# Patient Record
Sex: FEMALE | Race: WHITE
[De-identification: names, ages, dates, MRNs, and addresses within clinical notes are randomized per-mention and may not be internally consistent; named-entity substitution may affect disease eponyms.]

---

## 2017-01-23 ENCOUNTER — HOSPITAL ENCOUNTER (INPATIENT)
Dept: HOSPITAL 45 - C.EDC | Age: 62
LOS: 1 days | Discharge: ANOTHER HOSPITAL | DRG: 534 | End: 2017-01-24
Attending: HOSPITALIST | Admitting: HOSPITALIST
Payer: COMMERCIAL

## 2017-01-23 VITALS
WEIGHT: 244.71 LBS | HEIGHT: 60 IN | BODY MASS INDEX: 48.04 KG/M2 | WEIGHT: 244.71 LBS | HEIGHT: 60 IN | BODY MASS INDEX: 48.04 KG/M2

## 2017-01-23 DIAGNOSIS — M17.11: ICD-10-CM
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
## 2017-01-23 DIAGNOSIS — E66.9: ICD-10-CM

## 2017-01-23 DIAGNOSIS — Q89.01: ICD-10-CM

## 2017-01-23 DIAGNOSIS — F41.9: ICD-10-CM

## 2017-01-23 DIAGNOSIS — I10: ICD-10-CM

## 2017-01-23 DIAGNOSIS — Z90.81: ICD-10-CM

## 2017-01-23 DIAGNOSIS — Z79.82: ICD-10-CM

## 2017-01-23 DIAGNOSIS — S72.351A: Primary | ICD-10-CM

## 2017-01-23 DIAGNOSIS — D58.0: ICD-10-CM

## 2017-01-23 DIAGNOSIS — F32.9: ICD-10-CM

## 2017-01-23 DIAGNOSIS — Z79.899: ICD-10-CM

## 2017-01-23 DIAGNOSIS — Z79.1: ICD-10-CM

## 2017-01-23 DIAGNOSIS — Z87.891: ICD-10-CM
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
## 2017-01-23 LAB
ANION GAP SERPL CALC-SCNC: 13 MMOL/L (ref 3–11)
BASOPHILS # BLD: 0.11 K/UL (ref 0–0.2)
BASOPHILS NFR BLD: 0.3 %
BUN SERPL-MCNC: 18 MG/DL (ref 7–18)
BUN/CREAT SERPL: 16 (ref 10–20)
CALCIUM SERPL-MCNC: 9.1 MG/DL (ref 8.5–10.1)
CHLORIDE SERPL-SCNC: 104 MMOL/L (ref 98–107)
CO2 SERPL-SCNC: 24 MMOL/L (ref 21–32)
COMPLETE: YES
CREAT CL PREDICTED SERPL C-G-VRATE: 62.3 ML/MIN
CREAT SERPL-MCNC: 1.1 MG/DL (ref 0.6–1.2)
EOSINOPHIL NFR BLD AUTO: 369 K/UL (ref 130–400)
GLUCOSE SERPL-MCNC: 112 MG/DL (ref 70–99)
HCT VFR BLD CALC: 45 % (ref 37–47)
IG%: 0.9 %
IMM GRANULOCYTES NFR BLD AUTO: 7.8 %
INR PPP: 1 (ref 0.9–1.1)
LYMPHOCYTES # BLD: 2.95 K/UL (ref 1.2–3.4)
MCH RBC QN AUTO: 28.9 PG (ref 25–34)
MCHC RBC AUTO-ENTMCNC: 34.7 G/DL (ref 32–36)
MCV RBC AUTO: 83.3 FL (ref 80–100)
MONOCYTES NFR BLD: 5.9 %
NEUTROPHILS # BLD AUTO: 0.8 %
NEUTROPHILS NFR BLD AUTO: 84.3 %
NRBC BLD AUTO-RTO: 0.2 %
PARTIAL THROMBOPLASTIN RATIO: 1
PMV BLD AUTO: 12.3 FL (ref 7.4–10.4)
POTASSIUM SERPL-SCNC: 3.3 MMOL/L (ref 3.5–5.1)
PROTHROMBIN TIME: 10.7 SECONDS (ref 9–12)
RBC # BLD AUTO: 5.4 M/UL (ref 4.2–5.4)
SODIUM SERPL-SCNC: 141 MMOL/L (ref 136–145)
WBC # BLD AUTO: 37.83 K/UL (ref 4.8–10.8)

## 2017-01-23 RX ADMIN — HYDROMORPHONE HYDROCHLORIDE PRN MG: 1 INJECTION, SOLUTION INTRAMUSCULAR; INTRAVENOUS; SUBCUTANEOUS at 22:50

## 2017-01-23 RX ADMIN — HYDROMORPHONE HYDROCHLORIDE PRN MG: 1 INJECTION, SOLUTION INTRAMUSCULAR; INTRAVENOUS; SUBCUTANEOUS at 21:08

## 2017-01-23 NOTE — DIAGNOSTIC IMAGING REPORT
SINGLE VIEW PELVIS



CLINICAL HISTORY: Fall with right femoral fracture.



FINDINGS: 2 AP pelvic radiograph are correlated with pelvic CT dated 10/14/2013.

The skeletal structures are osteopenic. There is no radiographic evidence of

fracture involving the hips or bony pelvis. Mild arthritic change is seen in the

hip joints. The sacroiliac joints are normal in appearance. The overlying soft

tissues are unremarkable. There is a nonobstructed abdominal bowel gas pattern.



IMPRESSION: No fracture is identified in the hips or bony pelvis.







Electronically signed by:  Francois Pike M.D.

1/23/2017 10:00 PM



Dictated Date/Time:  1/23/2017 9:58 PM

## 2017-01-23 NOTE — DIAGNOSTIC IMAGING REPORT
RIGHT FEMUR 3 VIEWS; RIGHT KNEE 2 VIEWS



CLINICAL HISTORY: Fall with right leg pain.



FINDINGS: AP, frog-leg, and lateral views of the right femur with AP and lateral

views of the right knee are obtained. No prior studies are available for

comparison at the time of dictation. The skeletal structures are osteopenic.

There is a comminuted fracture involving the distal femoral diaphysis. There are

large distracted fragments. The distal femoral shaft is distracted medially by

approximately 4 cm, and there is apex dorsal angulation. There is overriding of

the fragments by approximately 3 cm. There is significant surrounding soft

tissue edema. Fracture does not appear to extend to the knee joint. The proximal

femur and the imaged right bony pelvis appear intact. The proximal tibia and

fibula are grossly intact. Arthritic change is present in the knee.



IMPRESSION:



1. There is a comminuted, overriding, and angulated fracture of the distal

femoral diaphysis with numerous distracted fragments and overlying soft tissue

edema.



2. The proximal femur appears intact, as do the proximal tibia and fibula.



3. Fracture does not appear to extend to the knee joint.







Electronically signed by:  Francois Pike M.D.

1/23/2017 9:56 PM



Dictated Date/Time:  1/23/2017 9:53 PM

## 2017-01-23 NOTE — HISTORY AND PHYSICAL
History & Physical


Date & Time of Service:


Jan 23, 2017 at 22:49


Chief Complaint:


Knee & Side Pain, Fall


Primary Care Physician:


DINESH West M.D.


History of Present Illness


Source:  patient


Dorothy Marshall is a 60 yo asplenic female with history of hereditary spherocytosis, 

s/p splenectomy, and hypertension, who presents with severe right leg pain 

after a fall on ice earlier today. She was stepping down a step and slipped and 

fell. She was brought to the ED and found to have a significant comminuted 

distal femur fracture. She reports her pain had improved with dilaudid but is 

slowly returning now. She feels better when her leg is slightly straightened 

out.





Past Medical/Surgical History


PMHx:


- Anxiety


- Depression


- Asplenia


- Hereditary spherocytosis





PSHx:


- S/P Splenectomy - spleen ruptured on own apparently, was sent to [place] 

from [facility]. Sister reports she is completely vaccinated.


- S/P Cholecystectomy


- S/P Appendectomy





Family History





Diabetes mellitus


FH: CVA (cerebrovascular accident)


FHx: cancer


Hypertension


FHx of hereditary spherocytosis as well





Social History


Smoking Status:  Former Smoker


Drug Use:  none





Immunizations


History of Influenza Vaccine:  No


History of Tetanus Vaccine?:  No


History of Pneumococcal:  No


History of Hepatitis B Vaccine:  No





Allergies


Coded Allergies:  


     Prednisone (Verified  Allergy, Severe, HIVES, 1/23/17)


     Metronidazole (Verified  Adverse Reaction, Unknown, "jumpiness" per pt, 1/ 23/17)





Home Medications


Scheduled


Aspirin (Aspirin Ec), 81 MG PO QAM


Ibuprofen (Advil), 400 MG PO BID


Lisinopril (Prinivil), 40 MG PO QAM


Nebivolol Hcl (Bystolic), 10 MG PO QPM


Sertraline (Zoloft), 50 MG PO QAM





Scheduled PRN


Lorazepam (Ativan), 0.5 MG PO BID PRN for Anxiety





Review of Systems


See HPI for pertinent positives & negatives. A total of 10 systems reviewed and 

were otherwise negative.





Physical Exam


Vital Signs











  Date Time  Temp Pulse Resp B/P Pulse Ox O2 Delivery O2 Flow Rate FiO2


 


1/23/17 22:41  92 16 169/105 99   


 


1/23/17 21:34  86 18 181/102 96   


 


1/23/17 21:20       4.0 


 


1/23/17 19:57 36.7 88 20 193/103 99   








General Appearance:  WD/WN, + mild distress


Head:  normocephalic, atraumatic


Eyes:  normal inspection


ENT:  hearing grossly normal


Neck:  supple, no JVD


Respiratory/Chest:  lungs clear, normal breath sounds, no respiratory distress


Cardiovascular:  regular rate, rhythm, no murmur, normal peripheral pulses


Abdomen/GI:  normal bowel sounds, non tender, soft


Extremities/Musculoskelatal:  + pertinent finding (right leg tender, limited 

ROM causing pain in all modalities. good peripheral pulses. )


Neurologic/Psych:  alert, normal mood/affect, oriented x 3


Skin:  no rash





Diagnostics


Laboratory Results





Results Past 24 Hours








Test


  1/23/17


20:16 Range/Units


 


 


White Blood Count 37.83 4.8-10.8  K/uL


 


Red Blood Count 5.40 4.2-5.4  M/uL


 


Hemoglobin 15.6 12.0-16.0  g/dL


 


Hematocrit 45.0 37-47  %


 


Mean Corpuscular Volume 83.3   fL


 


Mean Corpuscular Hemoglobin 28.9 25-34  pg


 


Mean Corpuscular Hemoglobin


Concent 34.7


  32-36  g/dl


 


 


Platelet Count 369 130-400  K/uL


 


Mean Platelet Volume 12.3 7.4-10.4  fL


 


Neutrophils (%) (Auto) 84.3  %


 


Lymphocytes (%) (Auto) 7.8  %


 


Monocytes (%) (Auto) 5.9  %


 


Eosinophils (%) (Auto) 0.8  %


 


Basophils (%) (Auto) 0.3  %


 


Neutrophils # (Auto) 31.89 1.4-6.5  K/uL


 


Lymphocytes # (Auto) 2.95 1.2-3.4  K/uL


 


Monocytes # (Auto) 2.23 0.11-0.59  K/uL


 


Eosinophils # (Auto) 0.30 0-0.5  K/uL


 


Basophils # (Auto) 0.11 0-0.2  K/uL


 


RDW Standard Deviation 44.0 36.4-46.3  fL


 


RDW Coefficient of Variation 14.5 11.5-14.5  %


 


Immature Granulocyte % (Auto) 0.9  %


 


Immature Granulocyte # (Auto) 0.35 0.00-0.02  K/uL


 


Nucleated RBC Absolute Count


(auto) 0.09


  0-0  K/uL


 


 


Nucleated Red Blood Cells % 0.2  %


 


Sodium Level 141 136-145  mmol/L


 


Potassium Level 3.3 3.5-5.1  mmol/L


 


Chloride Level 104   mmol/L


 


Carbon Dioxide Level 24 21-32  mmol/L


 


Anion Gap 13.0 3-11  mmol/L


 


Blood Urea Nitrogen 18 7-18  mg/dl


 


Creatinine


  1.10


  0.60-1.20


mg/dl


 


Est Creatinine Clear Calc


Drug Dose 62.3


   ml/min


 


 


Estimated GFR (


American) 63.6


   


 


 


Estimated GFR (Non-


American 54.9


   


 


 


BUN/Creatinine Ratio 16.0 10-20  


 


Random Glucose 112 70-99  mg/dl


 


Calcium Level 9.1 8.5-10.1  mg/dl











Diagnostic Radiology


RIGHT FEMUR 3 VIEWS; RIGHT KNEE 2 VIEWS





CLINICAL HISTORY: Fall with right leg pain.





FINDINGS: AP, frog-leg, and lateral views of the right femur with AP and lateral


views of the right knee are obtained. No prior studies are available for


comparison at the time of dictation. The skeletal structures are osteopenic.


There is a comminuted fracture involving the distal femoral diaphysis. There are


large distracted fragments. The distal femoral shaft is distracted medially by


approximately 4 cm, and there is apex dorsal angulation. There is overriding of


the fragments by approximately 3 cm. There is significant surrounding soft


tissue edema. Fracture does not appear to extend to the knee joint. The proximal


femur and the imaged right bony pelvis appear intact. The proximal tibia and


fibula are grossly intact. Arthritic change is present in the knee.





IMPRESSION:





1. There is a comminuted, overriding, and angulated fracture of the distal


femoral diaphysis with numerous distracted fragments and overlying soft tissue


edema.





2. The proximal femur appears intact, as do the proximal tibia and fibula.





3. Fracture does not appear to extend to the knee joint.











Electronically signed by:  Francois Pike M.D.


1/23/2017 9:56 PM





Dictated Date/Time:  1/23/2017 9:53 PM\








SINGLE VIEW PELVIS





CLINICAL HISTORY: Fall with right femoral fracture.





FINDINGS: 2 AP pelvic radiograph are correlated with pelvic CT dated 10/14/2013.


The skeletal structures are osteopenic. There is no radiographic evidence of


fracture involving the hips or bony pelvis. Mild arthritic change is seen in the


hip joints. The sacroiliac joints are normal in appearance. The overlying soft


tissues are unremarkable. There is a nonobstructed abdominal bowel gas pattern.





IMPRESSION: No fracture is identified in the hips or bony pelvis.





Impression


Assessment and Plan


60 yo F with hypertension, asplenia, anxiety, depression who presents with 

right distal femur fracture after a fall on the ice.





Plan:


Right distal femur fracture 


Reviewed by  Dr Kc in ED


NPO from midnight with IV fluids. 





HTN 


Hold Lisinopril 20mg in AM 


Hold ibuprofen, hold aspirin


Hydralazine IV 10mg q4h, PRN SBP > 150


Will convert Navedibolol to Metoprolol tartrate 50mg PO now, then BID, with 

hold parameters 





Anxiety / Depression


Hold sertraline


IV Ativan PRN





Asplenia 


Will start prophylactic antibiotics with Vanc and Rocephin, and adjust 

accordingly


Will start ceftriaxone 1gm now then q8h





DISPO: Med/Surg


CODE STATUS: Full


VTE: Will start heparin/Lovenox after surgery





Resident Tracking


Resident Involvement:  Resident Care Provided


Care Provided:  [facility]





Assessment and Plan


Attending addendum:


I have seen and examined this patient, have directed their medical care, have 

supervised the resident and agree with the H&P as noted above.


The full H&P is as follows below.\





HPI:


The patient is a 61-year-old female who presents emergency department with 

severe right leg pain that developed as she was walking down a step, and 

slipped and fell on ice.  X-rays in the emergency department revealed a complex 

distal femur fracture, and the patient was referred for evaluation for 

admission for pain control, and was seen by Dr. Kc from orthopedics, who 

plans to take the patient OR in the morning if medically stable.


The patient is accompanied by her daughters in the emergency department, who 

supplement the patient's history of present illness and review of systems.





ROS:


The patient denies chest pain, palpitations, shortness of breath, cough, vision 

change, hearing change, sore throat, fevers, chills, sweats, weight change, 

fatigue, nausea, vomiting, abdominal pain, pelvic pain, blood in urine or stool

, dysuria, urinary frequency or urgency, lightheadedness, dizziness, headache, 

memory loss, rash, abnormal bruising or bleeding, numbness or tingling in arms, 

arthralgias or myalgias other than in right leg, back or neck pain, night sweats

, or allergy symptoms.


The review of systems is otherwise negative other than for that already noted 

above, and at least 10 systems have been reviewed.





PMH:


Anxiety


Depression


Asplenia


Hereditary spherocytosis





PSH:


Status post splenectomy-due to spontaneous splenic rupture associated with 

hereditary spherocytosis, with up-to-date immunizations.


Status post cholecystectomy


Status post appendectomy





FH:


Diabetes mellitus


CVA


Cancer


Hypertension


Hereditary spherocytosis





SOCIAL HISTORY:


Former smoker


No drug use


No alcohol use





ALLERGIES:


Prednisone--caused severe hives


Metronidazole--caused jumpiness





MEDICATIONS UPON ADMISSION:


Enteric-coated aspirin 81 mg by mouth every morning.


Ibuprofen 400 mg by mouth twice a day.


Lisinopril 40 mg by mouth every morning.


Bystolic 10 mg by mouth every afternoon.


Sertraline 50 mg by mouth every morning.


Lorazepam 0.5 mg by mouth twice a day when necessary for anxiety.





Physical Exam:


The patient is awake, well-developed and adequately nourished, alert and 

oriented 3, mildly sedated post pain medications, normocephalic and atraumatic

, lying in bed and in reduced but tolerable pain .


HEENT--PERRL, EOMI, mucous membranes moist, and oropharynx normal. 


Neck--supple, no JVD or bruits, thyroid normal, trachea midline, no adenopathy.


Heart--normal S1 and S2, no extra beats, no murmurs, rubs or gallops.


Lungs--clear bilaterally with good air movement, no respiratory distress, no 

accessory muscle use.


Abdomen--normal bowel sounds and soft, nontender and nondistended, no hernias 

or masses,  no organomegaly.


Extremities--no cyanosis, clubbing or edema. There are good distal pulses b/l.


Dermatologic--normal skin turgor, normal color, warm and dry, no abnormal lymph 

nodes, no rash.


Neurologic--cranial nerves II through XII grossly intact.


Rheumatologic--pain over distal right femur with laxity of that area.


Psychiatric--normal affect, mildly sedated secondary to pain meds.





Imaging studies there is a comminuted, overriding and angulated fracture of the 

distal femoral diaphysis with numerous distracted fragments and overlying soft 

tissue edema.


The proximal femur appears intact, as to the proximal tibia and fibula.


Fracture does not appear to extend to the knee joint.





Assessment and Plan;


Distal femoral diaphysis fracture--the patient be admitted to the medical 

surgical floor, with nothing by mouth status.  She'll be placed on Dilaudid 0.5-

1 mg IV every 2 hours when necessary, IV fluids, Zofran for nausea, 

pantoprazole for GI prophylaxis, no DVT prophylaxis for possible surgery in the 

a.m.  She has been seen by the orthopedist and emergency department, with plans 

to take the patient to the OR.


The patient's chest x-ray has been reviewed and is normal, the patient's EKG 

shows NSR at 86, mildly prolonged QT, with no acute ST-T changes. Her B-Blocker 

was changed from Bystolic 10mg daily to metoprolol tartrate 50mg po bid. She 

would be considered an acceptable risk for the proposed surgery.





Hypertension--hold lisinopril 40 mg by mouth every morning, enteric-coated 

aspirin 81 mg by mouth every morning, Bystolic 10 mg by mouth every afternoon, 

and place on metoprolol tartrate 50 mg by mouth now and 50 mg by mouth twice a 

day with hold parameters.





Asplenectomy--we'll place on ceftriaxone 1 g IV daily and vancomycin 1 g IV per 

renal dosing for preoperative antibiotics.





Anxiety/depression--continue sertraline 50 mg by mouth every morning, and have 

lorazepam 0.5 mg by mouth 4 times a day when necessary available for anxiety.

## 2017-01-23 NOTE — EMERGENCY ROOM VISIT NOTE
History


Report prepared by Cherise:  Gregg Herrera


Under the Supervision of:  Dr. Truman Hennessy M.D.


First contact with patient:  19:51


Chief Complaint:  FALL


Stated Complaint:  KNEE & SIDE PAIN, FALL





History of Present Illness


The patient is a 61 year old female who presents to the Emergency Room with 

complaints of constant right knee pain due to a fall beginning just prior to 

arrival. She notes the pain worsens with movement. The patient states she was 

walking into her house when she slipped outside. She notes a history of severe 

arthritis. The patient denies hip pain or hurting her right knee in the past.





   Source of History:  patient


   Onset:  just prior to arrival


   Position:  knee (right)


   Timing:  constant


   Modifying Factors (Worsening):  movement





Review of Systems


See HPI for pertinent positives & negatives. A total of 10 systems reviewed and 

were otherwise negative.





Past Medical & Surgical


Medical Problems:


(1) Anxiety disorder


(2) Hereditary spherocytosis


(3) Hypertension


Surgical Problems:


(1) H/O splenectomy


(2) S/P appendectomy


(3) S/P cholecystectomy








Family History





Diabetes mellitus


FH: CVA (cerebrovascular accident)


FHx: cancer


Hypertension





Social History


Alcohol Use:  none


Drug Use:  none


Marital Status:  single


Occupation Status:  unemployed





Current/Historical Medications


Scheduled


Aspirin (Aspirin Ec), 81 MG PO QAM


Ibuprofen (Advil), 400 MG PO BID


Lisinopril (Prinivil), 40 MG PO QAM


Nebivolol Hcl (Bystolic), 10 MG PO QPM


Sertraline (Zoloft), 50 MG PO QAM





Scheduled PRN


Lorazepam (Ativan), 0.5 MG PO BID PRN for Anxiety





Allergies


Coded Allergies:  


     Prednisone (Verified  Allergy, Severe, HIVES, 1/23/17)


     Metronidazole (Verified  Adverse Reaction, Unknown, "jumpiness" per pt, 1/ 23/17)





Physical Exam


Vital Signs











  Date Time  Temp Pulse Resp B/P Pulse Ox O2 Delivery O2 Flow Rate FiO2


 


1/23/17 22:41  92 16 169/105 99   


 


1/23/17 21:34  86 18 181/102 96   


 


1/23/17 21:20       4.0 


 


1/23/17 19:57 36.7 88 20 193/103 99   











Physical Exam


GENERAL: Patient is very anxious appearing and in moderate distress.


HEENT: No acute trauma, normocephalic atraumatic, mucous membranes moist, no 

nasal congestion, no scleral icterus.


NECK: No stridor, no adenopathy, no meningismus, trachea is midline.


LUNGS: No dyspnea. Clear to auscultation and equal bilaterally. No wheeze, no 

rhonchi.


HEART: Regular rate and rhythm.  No murmurs, rubs, gallops appreciated.


ABDOMEN: Soft, nontender, bowel sounds positive, no masses appreciated, no 

peritonitis.


BACK: No midline tenderness, no CVA tenderness


EXTREMITIES: Tenderness to palpation of right femur with severe pain with any 

range of motion of right femur. Normal motion all other extremities, no cyanosis

, no edema.


NEUROLOGIC: Alert and oriented, no acute motor or sensory deficits, no focal 

weakness, cranial nerves grossly intact.


SKIN: No rash, no jaundice, no diaphoresis.





Medical Decision & Procedures


ER Provider


Diagnostic Interpretation:


X ray results are stated below per my interpretation and the radiologist's 

interpretation. 





SINGLE VIEW PELVIS





CLINICAL HISTORY: Fall with right femoral fracture.





FINDINGS: 2 AP pelvic radiograph are correlated with pelvic CT dated 10/14/2013.


The skeletal structures are osteopenic. There is no radiographic evidence of


fracture involving the hips or bony pelvis. Mild arthritic change is seen in the


hip joints. The sacroiliac joints are normal in appearance. The overlying soft


tissues are unremarkable. There is a nonobstructed abdominal bowel gas pattern.





IMPRESSION: No fracture is identified in the hips or bony pelvis.








Electronically signed by:  Francois Pike M.D.


1/23/2017 10:00 PM








RIGHT FEMUR 3 VIEWS; RIGHT KNEE 2 VIEWS





CLINICAL HISTORY: Fall with right leg pain.





FINDINGS: AP, frog-leg, and lateral views of the right femur with AP and lateral


views of the right knee are obtained. No prior studies are available for


comparison at the time of dictation. The skeletal structures are osteopenic.


There is a comminuted fracture involving the distal femoral diaphysis. There are


large distracted fragments. The distal femoral shaft is distracted medially by


approximately 4 cm, and there is apex dorsal angulation. There is overriding of


the fragments by approximately 3 cm. There is significant surrounding soft


tissue edema. Fracture does not appear to extend to the knee joint. The proximal


femur and the imaged right bony pelvis appear intact. The proximal tibia and


fibula are grossly intact. Arthritic change is present in the knee.





IMPRESSION:





1. There is a comminuted, overriding, and angulated fracture of the distal


femoral diaphysis with numerous distracted fragments and overlying soft tissue


edema.





2. The proximal femur appears intact, as do the proximal tibia and fibula.





3. Fracture does not appear to extend to the knee joint.








Electronically signed by:  Francois Pike M.D.


1/23/2017 9:56 PM








RIGHT FEMUR 3 VIEWS; RIGHT KNEE 2 VIEWS





CLINICAL HISTORY: Fall with right leg pain.





FINDINGS: AP, frog-leg, and lateral views of the right femur with AP and lateral


views of the right knee are obtained. No prior studies are available for


comparison at the time of dictation. The skeletal structures are osteopenic.


There is a comminuted fracture involving the distal femoral diaphysis. There are


large distracted fragments. The distal femoral shaft is distracted medially by


approximately 4 cm, and there is apex dorsal angulation. There is overriding of


the fragments by approximately 3 cm. There is significant surrounding soft


tissue edema. Fracture does not appear to extend to the knee joint. The proximal


femur and the imaged right bony pelvis appear intact. The proximal tibia and


fibula are grossly intact. Arthritic change is present in the knee.





IMPRESSION:





1. There is a comminuted, overriding, and angulated fracture of the distal


femoral diaphysis with numerous distracted fragments and overlying soft tissue


edema.





2. The proximal femur appears intact, as do the proximal tibia and fibula.





3. Fracture does not appear to extend to the knee joint.








Electronically signed by:  Francois Pike M.D.


1/23/2017 9:56 PM





Laboratory Results


1/23/17 20:16








Red Blood Count 5.40, Mean Corpuscular Volume 83.3, Mean Corpuscular Hemoglobin 

28.9, Mean Corpuscular Hemoglobin Concent 34.7, Mean Platelet Volume 12.3, 

Neutrophils (%) (Auto) 84.3, Lymphocytes (%) (Auto) 7.8, Monocytes (%) (Auto) 

5.9, Eosinophils (%) (Auto) 0.8, Basophils (%) (Auto) 0.3, Neutrophils # (Auto) 

31.89, Lymphocytes # (Auto) 2.95, Monocytes # (Auto) 2.23, Eosinophils # (Auto) 

0.30, Basophils # (Auto) 0.11





1/23/17 20:16

















Test


  1/23/17


20:16


 


White Blood Count


  37.83 K/uL


(4.8-10.8)


 


Red Blood Count


  5.40 M/uL


(4.2-5.4)


 


Hemoglobin


  15.6 g/dL


(12.0-16.0)


 


Hematocrit 45.0 % (37-47) 


 


Mean Corpuscular Volume


  83.3 fL


()


 


Mean Corpuscular Hemoglobin


  28.9 pg


(25-34)


 


Mean Corpuscular Hemoglobin


Concent 34.7 g/dl


(32-36)


 


Platelet Count


  369 K/uL


(130-400)


 


Mean Platelet Volume


  12.3 fL


(7.4-10.4)


 


Neutrophils (%) (Auto) 84.3 % 


 


Lymphocytes (%) (Auto) 7.8 % 


 


Monocytes (%) (Auto) 5.9 % 


 


Eosinophils (%) (Auto) 0.8 % 


 


Basophils (%) (Auto) 0.3 % 


 


Neutrophils # (Auto)


  31.89 K/uL


(1.4-6.5)


 


Lymphocytes # (Auto)


  2.95 K/uL


(1.2-3.4)


 


Monocytes # (Auto)


  2.23 K/uL


(0.11-0.59)


 


Eosinophils # (Auto)


  0.30 K/uL


(0-0.5)


 


Basophils # (Auto)


  0.11 K/uL


(0-0.2)


 


RDW Standard Deviation


  44.0 fL


(36.4-46.3)


 


RDW Coefficient of Variation


  14.5 %


(11.5-14.5)


 


Immature Granulocyte % (Auto) 0.9 % 


 


Immature Granulocyte # (Auto)


  0.35 K/uL


(0.00-0.02)


 


Nucleated RBC Absolute Count


(auto) 0.09 K/uL


(0-0)


 


Nucleated Red Blood Cells % 0.2 % 


 


Anion Gap


  13.0 mmol/L


(3-11)


 


Est Creatinine Clear Calc


Drug Dose 62.3 ml/min 


 


 


Estimated GFR (


American) 63.6 


 


 


Estimated GFR (Non-


American 54.9 


 


 


BUN/Creatinine Ratio 16.0 (10-20) 


 


Calcium Level


  9.1 mg/dl


(8.5-10.1)





Laboratory results as reviewed by me.





Medications Administered











 Medications


  (Trade)  Dose


 Ordered  Sig/Bob


 Route  Start Time


 Stop Time Status Last Admin


Dose Admin


 


 Fentanyl Citrate


  (Fentanyl Inj)  100 mcg  NOW  STAT


 IV  1/23/17 19:54


 1/23/17 19:56 DC 1/23/17 20:12


100 MCG


 


 Lorazepam 1 mg  1 mg  NOW  STAT


 IV  1/23/17 19:54


 1/23/17 19:56 DC 1/23/17 20:11


1 MG


 


 Sodium Chloride


  (Nss 1000ml)  1,000 ml @ 


 75 mls/hr  [id number] STAT


 IV  1/23/17 19:54


 1/24/17 09:13  1/23/17 19:54


75 MLS/HR


 


 Hydromorphone HCl


  (Dilaudid Inj)  1 mg  Q30M  PRN


 IV  1/23/17 20:45


 2/6/17 20:44  1/23/17 21:08


1 MG











ED Course


1951: The patient was evaluated in room C1B. A complete history and physical 

exam was performed.





1954: Ordered Sodium Chloride 1,000 ml @ 999 mls/hr IV, Ativan Inj 1 mg IV, 

Fentanyl Inj 100 mcg IV.





2030: Reevaluated the patient at this time, and she is feeling better. A Leon 

catheter will be placed, because the patient is unable to get up to provide a 

sample.





2045: Ordered Dilaudid Inj 1 mg IV.





2100: Reevaluated the patient at this time, and she is over at x-ray. I 

discussed the patient's elevated WBC with the patient's sister, who is a nurse, 

and she noted the patient's WBC routinely runs in the 30s.





2134: I spoke to Dr. Kc, [facility] (Orthopedic Surgery) 

about the patients case, and he will come in to see the patient.





2158: [place] [facility] [place] about the patients case, 

and he will follow the patient for further evaluation.





Medical Decision


Differential: Fracture, Dislocation, Cellulitis, Septic Joint, Ligamentous 

Injury, Effusion, DVT, amongst other pathologies entertained.





61 yr old female with slip and fall landing on right knee.  She has displaced 

comminuted right distal femur fracture.  Will likely need surgical 

intervention.  Unable to do bucks traction down here but will plan on starting 

on floor.  She is feeling improved with multiple rounds narcotic pain 

medications.  Leon in place due to inability to sit on toilet with this 

fracture.  She is stable and no other evidence of other injuries.   WBC 

elevated at baseline level per patient and sister (who is nurse).





Consults


Time Called:  2132


Consulting Physician:  Dr. Kc, [facility] (Orthopedic 

Surgery)


Returned Call:  2134


I spoke to Dr. Kc, [facility] (Orthopedic Surgery) about 

the patients case, and he will come in to see the patient.


Additional Consults:  


   Time Called:  2140


   Consulted Physician:  DANNI Hair (Hospitalist)


   Returned Call:  2158


Additional Comments:


I spoke to DANNI Hair (Hospitalist) about the patients case, and he 

will follow the patient for further evaluation.





Impression





 Primary Impression:  


 Femur fracture, right


 Additional Impression:  


 Fall





Scribe Attestation


The scribe's documentation has been prepared under my direction and personally 

reviewed by me in its entirety. I confirm that the note above accurately 

reflects all work, treatment, procedures, and medical decision making performed 

by me.





Departure Information


Dispostion


Being Evaluated By Hospitalist (DANNI Hair (Hospitalist) )





Problem Qualifiers








 Primary Impression:  


 Femur fracture, right


 Encounter type:  initial encounter  Femur location:  shaft  Fracture type:  

closed  Fracture morphology:  comminuted  Fracture alignment:  displaced  

Qualified Codes:  S72.351A - Displaced comminuted fracture of shaft of right 

femur, initial encounter for closed fracture


 Additional Impression:  


 Fall


 Encounter type:  initial encounter  Qualified Codes:  W19.XXXA - Unspecified 

fall, initial encounter

## 2017-01-24 VITALS
OXYGEN SATURATION: 100 % | TEMPERATURE: 98.06 F | SYSTOLIC BLOOD PRESSURE: 167 MMHG | DIASTOLIC BLOOD PRESSURE: 84 MMHG | HEART RATE: 75 BPM

## 2017-01-24 VITALS
TEMPERATURE: 98.06 F | OXYGEN SATURATION: 98 % | DIASTOLIC BLOOD PRESSURE: 90 MMHG | SYSTOLIC BLOOD PRESSURE: 172 MMHG | HEART RATE: 75 BPM

## 2017-01-24 VITALS
DIASTOLIC BLOOD PRESSURE: 81 MMHG | OXYGEN SATURATION: 99 % | TEMPERATURE: 98.24 F | HEART RATE: 72 BPM | SYSTOLIC BLOOD PRESSURE: 145 MMHG

## 2017-01-24 VITALS — OXYGEN SATURATION: 99 %

## 2017-01-24 VITALS
HEART RATE: 74 BPM | DIASTOLIC BLOOD PRESSURE: 78 MMHG | SYSTOLIC BLOOD PRESSURE: 160 MMHG | TEMPERATURE: 98.24 F | OXYGEN SATURATION: 99 %

## 2017-01-24 VITALS — SYSTOLIC BLOOD PRESSURE: 154 MMHG | DIASTOLIC BLOOD PRESSURE: 81 MMHG | HEART RATE: 92 BPM

## 2017-01-24 VITALS
HEART RATE: 80 BPM | TEMPERATURE: 97.34 F | SYSTOLIC BLOOD PRESSURE: 147 MMHG | OXYGEN SATURATION: 100 % | DIASTOLIC BLOOD PRESSURE: 80 MMHG

## 2017-01-24 VITALS — DIASTOLIC BLOOD PRESSURE: 78 MMHG | SYSTOLIC BLOOD PRESSURE: 160 MMHG | HEART RATE: 74 BPM

## 2017-01-24 VITALS — HEART RATE: 98 BPM | SYSTOLIC BLOOD PRESSURE: 150 MMHG | DIASTOLIC BLOOD PRESSURE: 76 MMHG

## 2017-01-24 VITALS — OXYGEN SATURATION: 100 %

## 2017-01-24 VITALS — SYSTOLIC BLOOD PRESSURE: 166 MMHG | DIASTOLIC BLOOD PRESSURE: 74 MMHG

## 2017-01-24 LAB
ALP SERPL-CCNC: 121 U/L (ref 45–117)
ALT SERPL-CCNC: 21 U/L (ref 12–78)
ANION GAP SERPL CALC-SCNC: 9 MMOL/L (ref 3–11)
AST SERPL-CCNC: 20 U/L (ref 15–37)
BASOPHILS # BLD: 0.05 K/UL (ref 0–0.2)
BASOPHILS NFR BLD: 0.2 %
BUN SERPL-MCNC: 16 MG/DL (ref 7–18)
BUN/CREAT SERPL: 16.2 (ref 10–20)
CALCIUM SERPL-MCNC: 8.3 MG/DL (ref 8.5–10.1)
CHLORIDE SERPL-SCNC: 104 MMOL/L (ref 98–107)
CO2 SERPL-SCNC: 30 MMOL/L (ref 21–32)
COMPLETE: YES
CREAT CL PREDICTED SERPL C-G-VRATE: 69.3 ML/MIN
CREAT SERPL-MCNC: 0.99 MG/DL (ref 0.6–1.2)
EOSINOPHIL NFR BLD AUTO: 348 K/UL (ref 130–400)
GLUCOSE SERPL-MCNC: 93 MG/DL (ref 70–99)
HCT VFR BLD CALC: 41.8 % (ref 37–47)
IG%: 0.3 %
IMM GRANULOCYTES NFR BLD AUTO: 8.1 %
LYMPHOCYTES # BLD: 2.27 K/UL (ref 1.2–3.4)
MAGNESIUM SERPL-MCNC: 2.1 MG/DL (ref 1.8–2.4)
MCH RBC QN AUTO: 28 PG (ref 25–34)
MCHC RBC AUTO-ENTMCNC: 32.3 G/DL (ref 32–36)
MCV RBC AUTO: 86.5 FL (ref 80–100)
MONOCYTES NFR BLD: 6.4 %
NEUTROPHILS # BLD AUTO: 0.1 %
NEUTROPHILS NFR BLD AUTO: 84.9 %
NRBC BLD AUTO-RTO: 0.1 %
PMV BLD AUTO: 12.3 FL (ref 7.4–10.4)
POTASSIUM SERPL-SCNC: 4.7 MMOL/L (ref 3.5–5.1)
RBC # BLD AUTO: 4.83 M/UL (ref 4.2–5.4)
SODIUM SERPL-SCNC: 143 MMOL/L (ref 136–145)
WBC # BLD AUTO: 28.02 K/UL (ref 4.8–10.8)

## 2017-01-24 RX ADMIN — METOPROLOL TARTRATE SCH MG: 50 TABLET, FILM COATED ORAL at 08:30

## 2017-01-24 RX ADMIN — SODIUM CHLORIDE AND POTASSIUM CHLORIDE SCH MLS/HR: 9; 1.49 INJECTION, SOLUTION INTRAVENOUS at 09:43

## 2017-01-24 RX ADMIN — HYDROMORPHONE HYDROCHLORIDE PRN MG: 1 INJECTION, SOLUTION INTRAMUSCULAR; INTRAVENOUS; SUBCUTANEOUS at 16:00

## 2017-01-24 RX ADMIN — SODIUM CHLORIDE AND POTASSIUM CHLORIDE SCH MLS/HR: 9; 1.49 INJECTION, SOLUTION INTRAVENOUS at 17:29

## 2017-01-24 RX ADMIN — HYDROMORPHONE HYDROCHLORIDE PRN MG: 1 INJECTION, SOLUTION INTRAMUSCULAR; INTRAVENOUS; SUBCUTANEOUS at 11:07

## 2017-01-24 RX ADMIN — METOPROLOL TARTRATE SCH MG: 50 TABLET, FILM COATED ORAL at 21:03

## 2017-01-24 RX ADMIN — HYDROMORPHONE HYDROCHLORIDE PRN MG: 1 INJECTION, SOLUTION INTRAMUSCULAR; INTRAVENOUS; SUBCUTANEOUS at 01:40

## 2017-01-24 RX ADMIN — HYDROMORPHONE HYDROCHLORIDE PRN MG: 1 INJECTION, SOLUTION INTRAMUSCULAR; INTRAVENOUS; SUBCUTANEOUS at 22:03

## 2017-01-24 RX ADMIN — SODIUM CHLORIDE AND POTASSIUM CHLORIDE SCH MLS/HR: 9; 1.49 INJECTION, SOLUTION INTRAVENOUS at 01:40

## 2017-01-24 RX ADMIN — HYDROMORPHONE HYDROCHLORIDE PRN MG: 1 INJECTION, SOLUTION INTRAMUSCULAR; INTRAVENOUS; SUBCUTANEOUS at 13:57

## 2017-01-24 RX ADMIN — HYDROMORPHONE HYDROCHLORIDE PRN MG: 1 INJECTION, SOLUTION INTRAMUSCULAR; INTRAVENOUS; SUBCUTANEOUS at 19:12

## 2017-01-24 RX ADMIN — HYDROMORPHONE HYDROCHLORIDE PRN MG: 1 INJECTION, SOLUTION INTRAMUSCULAR; INTRAVENOUS; SUBCUTANEOUS at 06:44

## 2017-01-24 NOTE — DIAGNOSTIC IMAGING REPORT
CT RIGHT DISTAL FEMUR NO CONTRAST



CT DOSE: 2802.51 mGy.cm



CLINICAL HISTORY: Fracture    



TECHNIQUE: Helical images were acquired in the transverse plane. Sagittal and

coronal reformatted images were acquired.



COMPARISON STUDY:  Conventional radiographic study dated 1/23/2017



FINDINGS: There is a comminuted fracture of the distal femoral diaphysis

extending intra-articularly to the intercondylar region. The major distal

fragment is anterior displaced by approximately one full shaft width. There is

lateral displacement by approximately one half shaft width. There is also mild

foreshortening and angulation at the fracture site. The intra-articular

intercondylar fracture component demonstrates 4 mm of maximal displacement.

There are advanced arthritic changes present within the knee with marked

narrowing of the medial joint compartment.



There is a lipoma hemarthrosis.



IMPRESSION:  

1. Comminuted fracture of the distal femoral diaphysis and metaphysis with

intra-articular extension.  There is foreshortening and angulation at the

fracture site.

2. Advanced arthritic changes involving the medial joint compartment of the knee

3. Lipohemarthrosis







Electronically signed by:  Jorge Núñez M.D.

1/24/2017 7:27 AM



Dictated Date/Time:  1/24/2017 7:21 AM

## 2017-01-24 NOTE — DIAGNOSTIC IMAGING REPORT
CHEST ONE VIEW PORTABLE



CLINICAL HISTORY: Preoperative chest    



COMPARISON STUDY:  No previous studies for comparison.



FINDINGS: The heart is at the upper limits of normal in size. There is no

failure. There is no focal pulmonary consolidation. There are no pleural

effusions.[ 



IMPRESSION: No active disease in the chest.







Electronically signed by:  Jorge Núñez M.D.

1/24/2017 6:45 AM



Dictated Date/Time:  1/24/2017 6:44 AM

## 2017-01-24 NOTE — DISCHARGE INSTRUCTIONS
Discharge Instructions


Admission


Reason for Admission:  Fracture Of Distal End Of Right Femur





Discharge


Discharge Diagnosis / Problem:  Right distal femur fracture





Discharge Goals


Goal(s):  Decrease discomfort, Improve function, Increase independence





Activity Recommendations


Activity Limitations:  as noted below


Non weight bearing. Keep immobilizer on. NPO.


.





Current Hospital Diet


Patient's current hospital diet:





Discharge Diet


Recommended Diet:  N/A (NPO)





Pending Studies


Studies pending at discharge:  no





Medical Emergencies








.


Who to Call and When:





Medical Emergencies:  If at any time you feel your situation is an emergency, 

please call 911 immediately.





.





Non-Emergent Contact


Non-Emergency issues call your:  Surgeon


.





"Provider Documentation" section prepared by Ralph Brenner PA-C.





VTE Core Measure


Inpt VTE Proph given/why not?:  T.E.D. Stockings, SCD's

## 2017-01-24 NOTE — DIAGNOSTIC IMAGING REPORT
Right femur single view



CLINICAL HISTORY: Fracture status post reduction    



COMPARISON STUDY:  Earlier in the day



FINDINGS: A single AP view of the femur is provided for interpretation. There is

an extensively comminuted fracture of the femur, the proximal extent of which is

approximately the junction of the middle distal one third finger. On this single

view, the major distal fragment is laterally displaced x 19 mm.



IMPRESSION:  Comminuted fracture of the distal metadiaphyseal portion of the

femur with improved alignment status post reduction 









Electronically signed by:  Jorge Núñez M.D.

1/24/2017 6:47 AM



Dictated Date/Time:  1/24/2017 6:45 AM

## 2017-01-24 NOTE — DISCHARGE SUMMARY
Discharge Summary


Admission Date:


Jan 23, 2017 at 23:14


Discharge Date:  Jan 24, 2017


Discharge Disposition:  Acute care facility


Principal Diagnosis:  Comminuted right femur fracture


Problems/Secondary Diagnoses:


Right femur single view





CLINICAL HISTORY: Fracture status post reduction    





COMPARISON STUDY:  Earlier in the day





FINDINGS: A single AP view of the femur is provided for interpretation. There is


an extensively comminuted fracture of the femur, the proximal extent of which is


approximately the junction of the middle distal one third finger. On this single


view, the major distal fragment is laterally displaced x 19 mm.





IMPRESSION:  Comminuted fracture of the distal metadiaphyseal portion of the


femur with improved alignment status post reduction


Immunizations:  


   Have You Had Influenza Vaccine:  No


   History of Tetanus Vaccine?:  No


   History of Pneumococcal:  No


   History of Hepatitis B Vaccine:  No


 (Suman Hurst MD)





Medication Reconciliation


Continued Medications:  


Aspirin (Aspirin Ec) 81 Mg Tab


81 MG PO QAM





Lisinopril (Prinivil) 40 Mg Tab


40 MG PO QAM, TAB





Lorazepam (Ativan) 0.5 Mg Tab


0.5 MG PO BID PRN for Anxiety, TAB





Nebivolol Hcl (Bystolic) 10 Mg Tab


10 MG PO QPM, TAB





Sertraline (Zoloft) 50 Mg Tab


50 MG PO QAM, TAB





 


Discontinued Medications:  


Ibuprofen (Advil) 200 Mg Tab


400 MG PO BID, TAB











Discharge Exam


Patient sitting comfortably in bed after having recently received a dose of 

Dilaudid analgesia


She is alert, but frustrated and disappointed this this fall and fracture took 

place. She had a splenic rupture in 2013, and recently lost her mother in 

November 2016, and so feels very emotional right now. Patient complaining of 

dry mouth as she is NPO. She was given swab to wet her mouth. She otherwise 

denies overt symptoms. She says the analgesia takes the edge of the pain, but 

does not resolve it. Is concerned about recovery time, including rehab.


Review of Systems:  


   Constitutional:  No chills, No fever, No sweats


   Respiratory:  No cough, No shortness of breath, No wheezing


   Cardiovascular:  No chest pain, No palpitations


   Abdomen:  + nausea, No pain, No vomiting


   Neurologic:  + weakness, No numbness/tingling


Physical Exam:  


   General Appearance:  WD/WN, + moderate distress, + obese


   Respiratory/Chest:  chest non-tender, lungs clear, normal breath sounds, no 

respiratory distress


   Cardiovascular:  regular rate, rhythm, no murmur, normal peripheral pulses


   Abdomen / GI:  normal bowel sounds, non tender, soft


   Extremities:  + pertinent finding (Right leg reduced. Has sensation in feet, 

able to wiggle toes. Dorsalis pedal pulse present)


   Neurologic/Psychiatric:  alert, normal mood/affect, oriented x 3


 (Suman Hurst MD)





Hospital Course


61 year old female who presents emergency department with severe right leg pain 

secondary to mechanical fall - slipped on ice and landed on right knee. X-rays 

revealed a complex distal femur fracture, and the patient was referred for 

evaluation for admission for pain control, and was seen by Dr. Kc from 

orthopedics.





The patient was admitted to the medical surgical floor.


She was kept with nothing by mouth status, given aim for surgery in the morning.


Chest x-ray was reviewed to be normal, the patient's EKG showed NSR at 86, 

mildly prolonged QT, with no acute ST-T changes. She would be considered an 

acceptable risk for the proposed surgery.


She was placed on Dilaudid 0.5-1 mg IV every 2 hours when necessary, IV fluids, 

Zofran for nausea, pantoprazole for GI prophylaxis, no DVT prophylaxis in view 

of surgery


Given asplenectomy, she was also placed on ceftriaxone 1 g IV daily and 

vancomycin 1 g IV per renal dosing for preoperative antibiotics.


Patient seen and assessed by orthopedics over night. 


Right femur was reduced as indicated  by a subsequent femur x-ray reporting: 

Comminuted fracture of the distal metadiaphyseal portion of the femur with 

improved alignment status post reduction. 


However, orthopedist Dr. Kc believed that patient would benefit from 

surgery and care management given complicated nature of fracture.


Arrangements were made to transfer patient to [place].


Prior to transfer, discussed with patient the need to follow up with DEXA and 

vitamin to rule out non-traumatic contributing factors to fracture.


Patient understood.


Total Time Spent:  Less than 30 minutes


This includes examination of the patient, discharge planning, medication 

reconciliation, and communication with other providers.


 (Suman Hurst MD)


Resident Physician Supervision Note:


I interviewed and examined the patient. Discussed with  [Natali] and agree 

with findings and plan as documented in the note. Any exceptions or 

clarifications are listed here: [None]





Documented By:  Ramiro Arredondo





pain reasonably controlled, ortho notes that she'll be better served at 

tertiary care.  she's never had a DEXA, discussed.


vitals noted, nad at rest, breathing unlabored, no pallor


femur fx - for tertiary transfer once bed available, will want to check vitamin 

D (d/w pt but will hold off here since she's for transfer and that raises risk 

of D level "slipping through the cracks" and should have DEXA after discharge)


for transfer when bed available.


otherwise as above


Total Time Spent:  Less than 30 minutes


 (Ramiro Arredondo, D.O.)


Discharge Instructions


Please refer to the electronic Patient Visit Report (Discharge Instructions) 

for additional information.


 (Suman Hurst MD)

## 2017-01-24 NOTE — ORTHOPEDIC CONSULTATION
DATE OF CONSULTATION:  01/23/2017

 

HISTORY OF PRESENT ILLNESS:  The patient is a 61-year-old female who slipped

and fell outside her home injuring her right knee.  She has history of

arthritis in that knee.  She was brought to Emergency Room.  She was

diagnosed with a distal left femoral fracture.

 

She denies other injury.  There is no numbness or tingling.

 

PAST MEDICAL HISTORY:  Significant for hypertension, anxiety, depression, and

right knee arthritis, obesity.  She is a 61-year-old female.

 

PAST SURGICAL HISTORY:  Include splenectomy, gallbladder, hysterectomy.

 

ALLERGIES:  PREDNISONE GIVES HER HIVES, ALSO ALLERGIC TO FLAGYL.

 

MEDICATIONS:  Noted in the review of her medical record; sertraline, Ativan,

Prinivil, Advil and aspirin.

 

PHYSICAL EXAMINATION:

EXTREMITIES:  She is tender over the right distal thigh area.  There is

perhaps some swelling.  There is no break in the skin.  Her upper thigh, leg

and ankle are nontender.  She has 1+ dorsalis pedis and posterior tib pulses.

 Sensation in the foot and leg is normal.  She can flex and extend her ankle

and toes normally against resistance.

 

DIAGNOSTIC IMAGING:  Radiographs including an AP traction film show a

comminuted fracture in the metadiaphysis junction of the right distal femur. 

There appears to be arthritis in the right knee.  It is uncertain or unclear

if the fracture extends into the joint area.

 

IMPRESSION:  Obesity.

 

PLAN:  The patient is overweight and has a large leg.  We will apply a knee

immobilizer and some traction in an attempt to keep her stabilized and

comfortable.  She is going to be admitted by medicine and will have pain

control overnight with icing and elevation as well as neurovascular checks. 

We will obtain a CT scan of the distal femur in order to evaluate whether

this fracture disrupts the condyles.  Considerations would be for an

intramedullary fixation versus ORIF.  I think this would be more suited to a

retrograde femoral nail.

 

I discussed with patient and her family that surgical intervention is likely

necessary to achieve optimal result.  We will discuss further after we review

the other imaging.

## 2017-02-15 ENCOUNTER — HOSPITAL ENCOUNTER (OUTPATIENT)
Dept: HOSPITAL 45 - C.RDSM | Age: 62
Discharge: HOME | End: 2017-02-15
Attending: ORTHOPAEDIC SURGERY
Payer: COMMERCIAL
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
## 2017-02-15 DIAGNOSIS — S72.91XA: Primary | ICD-10-CM

## 2017-02-28 ENCOUNTER — HOSPITAL ENCOUNTER (OUTPATIENT)
Dept: HOSPITAL 45 - C.LABUPNIT | Age: 62
Discharge: NURSING, REHAB OR LONG-TERM CARE | End: 2017-02-28
Attending: HOSPITALIST
Payer: COMMERCIAL

## 2017-02-28 DIAGNOSIS — Q89.01: Primary | ICD-10-CM

## 2017-02-28 LAB
ANION GAP SERPL CALC-SCNC: 9 MMOL/L (ref 3–11)
BUN SERPL-MCNC: 13 MG/DL (ref 7–18)
BUN/CREAT SERPL: 13.2 (ref 10–20)
CALCIUM SERPL-MCNC: 8.8 MG/DL (ref 8.5–10.1)
CHLORIDE SERPL-SCNC: 101 MMOL/L (ref 98–107)
CO2 SERPL-SCNC: 31 MMOL/L (ref 21–32)
CREAT SERPL-MCNC: 1 MG/DL (ref 0.6–1.2)
EOSINOPHIL NFR BLD AUTO: 566 K/UL (ref 130–400)
GLUCOSE SERPL-MCNC: 72 MG/DL (ref 70–99)
HCT VFR BLD CALC: 38.8 % (ref 37–47)
MCH RBC QN AUTO: 25.5 PG (ref 25–34)
MCHC RBC AUTO-ENTMCNC: 29.4 G/DL (ref 32–36)
MCV RBC AUTO: 86.8 FL (ref 80–100)
NRBC BLD AUTO-RTO: 0.4 %
PMV BLD AUTO: 11.3 FL (ref 7.4–10.4)
POTASSIUM SERPL-SCNC: 3.5 MMOL/L (ref 3.5–5.1)
RBC # BLD AUTO: 4.47 M/UL (ref 4.2–5.4)
SODIUM SERPL-SCNC: 141 MMOL/L (ref 136–145)
WBC # BLD AUTO: 18.83 K/UL (ref 4.8–10.8)

## 2017-03-10 NOTE — CODING QUERY NO DIAGNOSIS
TREATMENT RENDERED WITHOUT A DIAGNOSIS                                                  

: 1955



To promote full compliance with coding requirements relating to patient care, physician 
participation is requested in all cases of  uncertainty.  Please assist us with 
providing a diagnosis/symptom for the test(s) below:



A diagnosis/symptom was not documented on your Order.  A valid diagnosis/symptom is 
required to bill all insurances.



**Please remember that we are unable to code a diagnosis of rule out, probable, possible, 
questionable, or suspected.  



Tests that require a diagnosis:

DOS: 17



* CBC W/O DIFF              DIAGNOSIS:

* PARTIAL RENAL PROFILE          DIAGNOSIS:





Provider Signature: _____________________________ Date: _________



Thank you  

Viky Morales

Health Information Management

Phone:  [phone]

Fax:  [phone]



Once completed, please kindly fax back to [phone]



For questions please call [phone]

## 2017-03-13 ENCOUNTER — HOSPITAL ENCOUNTER (OUTPATIENT)
Dept: HOSPITAL 45 - C.RDSM | Age: 62
Discharge: HOME | End: 2017-03-13
Attending: ORTHOPAEDIC SURGERY
Payer: COMMERCIAL

## 2017-03-13 DIAGNOSIS — S72.351D: Primary | ICD-10-CM
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
## 2017-03-31 ENCOUNTER — HOSPITAL ENCOUNTER (OUTPATIENT)
Dept: HOSPITAL 45 - C.RDSM | Age: 62
Discharge: HOME | End: 2017-03-31
Attending: ORTHOPAEDIC SURGERY
Payer: COMMERCIAL
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
## 2017-03-31 DIAGNOSIS — S72.351D: Primary | ICD-10-CM

## 2017-04-18 ENCOUNTER — HOSPITAL ENCOUNTER (OUTPATIENT)
Dept: HOSPITAL 45 - C.RDSM | Age: 62
Discharge: HOME | End: 2017-04-18
Attending: ORTHOPAEDIC SURGERY
Payer: COMMERCIAL
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
## 2017-04-18 DIAGNOSIS — S72.351D: Primary | ICD-10-CM

## 2017-05-17 ENCOUNTER — HOSPITAL ENCOUNTER (OUTPATIENT)
Dept: HOSPITAL 45 - C.RDSM | Age: 62
Discharge: HOME | End: 2017-05-17
Attending: ORTHOPAEDIC SURGERY
Payer: COMMERCIAL
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
## 2017-05-17 DIAGNOSIS — S72.351D: Primary | ICD-10-CM

## 2017-05-23 ENCOUNTER — HOSPITAL ENCOUNTER (OUTPATIENT)
Dept: HOSPITAL 45 - C.CTS | Age: 62
Discharge: HOME | End: 2017-05-23
Attending: ORTHOPAEDIC SURGERY
Payer: COMMERCIAL
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
## 2017-05-23 DIAGNOSIS — S72.351D: Primary | ICD-10-CM

## 2017-05-23 NOTE — DIAGNOSTIC IMAGING REPORT
CT OF THE RIGHT FEMUR 



CT DOSE: 1753.87 mGy.cm



HISTORY: Fracture right femur  RT FEMUR FX



TECHNIQUE: Multiaxial CT images of the right femur were performed and

reformatted in the sagittal and coronal plane without the use of contrast.



COMPARISON:  None.



FINDINGS: Findings of a comminuted fracture of the distal metadiaphyseal region

of the right femur. Hairline interlaminar extension to the patellofemoral joint

as well as intercondylar notch region. Bony apposition is generally good.



Findings of a long plate traversing the length of the femur with multiple

orthogonal screws. There is a small amount of callus formation. A significant

degree of bony bridging is not felt to be present.



IMPRESSION:  



1. Findings consistent with open reduction internal fixation of a fracture of

the distal femoral shaft extending to the articular services of the

patellofemoral joint as well as intercondylar notch region.



2. Bony alignment is in general anatomic, although evidence for bone healing is

minimal.







Electronically signed by:  Faisal Angela M.D.

5/23/2017 4:30 PM



Dictated Date/Time:  5/23/2017 4:27 PM

## 2017-06-22 ENCOUNTER — HOSPITAL ENCOUNTER (EMERGENCY)
Dept: HOSPITAL 45 - C.EDB | Age: 62
Discharge: HOME | End: 2017-06-22
Payer: COMMERCIAL

## 2017-06-22 VITALS
HEIGHT: 60 IN | HEIGHT: 60 IN | WEIGHT: 220.46 LBS | BODY MASS INDEX: 43.28 KG/M2 | WEIGHT: 220.46 LBS | BODY MASS INDEX: 43.28 KG/M2

## 2017-06-22 VITALS
OXYGEN SATURATION: 98 % | HEART RATE: 78 BPM | SYSTOLIC BLOOD PRESSURE: 192 MMHG | TEMPERATURE: 98.78 F | DIASTOLIC BLOOD PRESSURE: 85 MMHG

## 2017-06-22 DIAGNOSIS — F41.9: ICD-10-CM

## 2017-06-22 DIAGNOSIS — Z88.8: ICD-10-CM

## 2017-06-22 DIAGNOSIS — Z83.3: ICD-10-CM

## 2017-06-22 DIAGNOSIS — Z98.890: ICD-10-CM

## 2017-06-22 DIAGNOSIS — Z82.3: ICD-10-CM

## 2017-06-22 DIAGNOSIS — S72.91XA: Primary | ICD-10-CM

## 2017-06-22 DIAGNOSIS — Z79.899: ICD-10-CM

## 2017-06-22 DIAGNOSIS — Z90.49: ICD-10-CM

## 2017-06-22 DIAGNOSIS — Z87.81: ICD-10-CM

## 2017-06-22 DIAGNOSIS — I10: ICD-10-CM

## 2017-06-22 DIAGNOSIS — Z80.9: ICD-10-CM
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
## 2017-06-22 DIAGNOSIS — Z79.82: ICD-10-CM

## 2017-06-22 DIAGNOSIS — Z82.49: ICD-10-CM

## 2017-06-22 NOTE — DIAGNOSTIC IMAGING REPORT
RIGHT FEMUR 3 VIEWS



CLINICAL HISTORY: Right thigh pain.



FINDINGS: AP, crosstable lateral, and frog-leg views of the right femur are

compared to study dated 1/23/2017. The skeletal structures are osteopenic. No

acute fracture is seen. There is posttraumatic deformity from a comminuted

fracture the distal femur. There is incomplete bony fusion between several

fragments. There has been buttress plate fixation along the lateral femoral

cortex. Numerous cortical lag screws transfix the buttress plate. There is

fracture of the buttress plate seen at the level of the distal femoral

metaphysis. Arthritic change is present in the right hip and knee. Mild soft

tissue edema is present in the thigh.



IMPRESSION:



1. Soft tissue edema is noted in the thigh. No acute fracture is seen.



2. There is posttraumatic deformity in the mid to distal femur from a comminuted

fracture which was acute on 1/23/2017. There is incomplete bony fusion between

several fragments.



3. There has been buttress plate fixation along the lateral cortex of the femur.

The buttress plate is fractured distally. Orthopedic follow-up is recommended.







Electronically signed by:  Francois Pike M.D.

6/22/2017 8:53 PM



Dictated Date/Time:  6/22/2017 8:49 PM

## 2017-06-22 NOTE — DIAGNOSTIC IMAGING REPORT
ULTRASOUND RIGHT LOWER EXTREMITY VENOUS 



CLINICAL HISTORY: Right thigh pain.



COMPARISON STUDY: No priors.



TECHNIQUE: Real-time, grayscale, and color Doppler sonography of the deep veins

of the right lower extremity was performed from the inguinal crease to the calf.

Compression and augmentation were utilized. 



FINDINGS: There is no sonographic evidence of deep venous thrombosis identified

in the right lower extremity. The common femoral, superficial femoral, and

popliteal veins are patent and normally compressible. The greater saphenous vein

and the profunda femoris vein at the junction with the common femoral vein are

clear. The visualized calf veins are patent.



IMPRESSION: There is no sonographic evidence of deep venous thrombosis

identified in the right lower extremity.







Electronically signed by:  Francois Pike M.D.

6/22/2017 9:18 PM



Dictated Date/Time:  6/22/2017 9:18 PM

## 2017-06-23 NOTE — EMERGENCY ROOM VISIT NOTE
History


First contact with patient:  19:49


Chief Complaint:  LEG PAIN,LEG INJURY


Stated Complaint:  RLE PAIN- HX FEMUR FX 1/17





History of Present Illness


The patient is a 62 year old female who presents to the Emergency Room with 

complaints of right eye pain.  The patient reports that she noticed pain after 

sitting on a love seat arm today.  She reports pain with weightbearing.  The 

patient has a significant history of right distal femur fracture, undergoing 

ORIF earlier this year by Dr. Kc.  The patient reports that she has poor 

bone healing, and pending her evaluation tomorrow morning, will most likely be 

transferred to [facility] for consultation.  Her appointment 

tomorrow with Dr. Kc is at 11:00.  The patient denies any pain extending 

below the knee or into the back.  Weightbearing worsens her pain to an 8 out of 

10.  She does have a walker at home.  She denies any paresthesias or numbness 

of the right lower extremity.  She denies any prior history of DVT.  The 

patient currently takes a baby aspirin daily.





Review of Systems


10 system review was performed and was negative except for pertinent positives 

and negatives as indicated in history of present illness





Past Medical/Surgical History


Medical Problems:


(1) Anxiety disorder


(2) Fracture of distal end of right femur


(3) Hereditary spherocytosis


(4) Hypertension


Surgical Problems:


(1) H/O splenectomy


(2) S/P appendectomy


(3) S/P cholecystectomy








Family History





Diabetes mellitus


FH: CVA (cerebrovascular accident)


FHx: cancer


Hypertension





Social History


Smoking Status:  Never Smoker


Alcohol Use:  none


Drug Use:  none


Marital Status:  single


Occupation Status:  unemployed





Current/Historical Medications


Scheduled


Allopurinol (Zyloprim), 300 MG PO DAILY


Aspirin (Aspirin Ec), 81 MG PO QAM


Calcium Carbonate (Calcium 600), 1 TAB PO DAILY


Ergocalciferol (Vitamin D 88841 Unit), 50,000 UNIT PO 2XWK


Folic Acid (Folvite), 1 MG PO DAILY


Lisinopril (Prinivil), 40 MG PO QAM


Nebivolol Hcl (Bystolic), 10 MG PO QPM


Sertraline (Zoloft), 50 MG PO QAM





Scheduled PRN


Lorazepam (Ativan), 0.5 MG PO BID PRN for Anxiety


Tramadol (Ultram), 1 TAB PO DAILY PRN for Pain





Allergies


Coded Allergies:  


     Prednisone (Verified  Allergy, Severe, HIVES, 6/22/17)


     Metronidazole (Verified  Adverse Reaction, Unknown, "jumpiness" per pt, 6/ 22/17)





Physical Exam


Vital Signs











  Date Time  Temp Pulse Resp B/P (MAP) Pulse Ox O2 Delivery O2 Flow Rate FiO2


 


6/22/17 19:12 37.1 78 18 192/85 98 Room Air  











Physical Exam


CONSTITUTIONAL: Obese female, alert and oriented X 3 with positive affect.  She 

does not appear in any acute distress.


HEENT:  Normocephalic, atraumatic.  Pupils equal, round and reactive.  


NECK:  Full active range of motion without discomfort.


RESPIRATORY:  Clear to auscultation bilaterally with no wheezing, crackles, 

rhonchi or stridor.


CARDIOVASCULAR:  Regular rate and rhythm with no murmurs, rubs or gallops.


MUSCULOSKELETAL: Examination of the right thigh does not show any ecchymosis or 

hematoma formations.  She has mild tenderness to palpation through the 

posterior and lateral thigh.  Negative logroll.  Pedal pulses are intact.


INTEGUMENTARY:  No rash or other significant dermatologic conditions noted.


NEUROLOGIC:  No focal neurologic deficits noted.





Medical Decision & Procedures


ER Provider


Diagnostic Interpretation:


My interpretation of a right femur x-ray shows a lateral buttress plate 

fracture just above the prior femur fracture.  Radiologist report is as follows:





RIGHT FEMUR 3 VIEWS





CLINICAL HISTORY: Right thigh pain.





FINDINGS: AP, crosstable lateral, and frog-leg views of the right femur are


compared to study dated 1/23/2017. The skeletal structures are osteopenic. No


acute fracture is seen. There is posttraumatic deformity from a comminuted


fracture the distal femur. There is incomplete bony fusion between several


fragments. There has been buttress plate fixation along the lateral femoral


cortex. Numerous cortical lag screws transfix the buttress plate. There is


fracture of the buttress plate seen at the level of the distal femoral


metaphysis. Arthritic change is present in the right hip and knee. Mild soft


tissue edema is present in the thigh.





IMPRESSION:





1. Soft tissue edema is noted in the thigh. No acute fracture is seen.





2. There is posttraumatic deformity in the mid to distal femur from a comminuted


fracture which was acute on 1/23/2017. There is incomplete bony fusion between


several fragments.





3. There has been buttress plate fixation along the lateral cortex of the femur.


The buttress plate is fractured distally. Orthopedic follow-up is recommended.





==============================================





Venous ultrasound of the right lower extremity does not show any evidence for 

deep vein thrombosis.  Radiologist report is as follows:





ULTRASOUND RIGHT LOWER EXTREMITY VENOUS 





CLINICAL HISTORY: Right thigh pain.





COMPARISON STUDY: No priors.





TECHNIQUE: Real-time, grayscale, and color Doppler sonography of the deep veins


of the right lower extremity was performed from the inguinal crease to the calf.


Compression and augmentation were utilized. 





FINDINGS: There is no sonographic evidence of deep venous thrombosis identified


in the right lower extremity. The common femoral, superficial femoral, and


popliteal veins are patent and normally compressible. The greater saphenous vein


and the profunda femoris vein at the junction with the common femoral vein are


clear. The visualized calf veins are patent.





IMPRESSION: There is no sonographic evidence of deep venous thrombosis


identified in the right lower extremity.





Medications Administered











 Medications


  (Trade)  Dose


 Ordered  Sig/Bob


 Route  Start Time


 Stop Time Status Last Admin


Dose Admin


 


 Tramadol HCl


  (Ultram Tab)  50 mg  ONE  STAT


 PO  6/22/17 20:03


 6/22/17 20:05 DC 6/22/17 20:20


50 MG











ED Course


Patient history and physical exam were performed.  Nurse's notes were reviewed.

  Vital signs were reviewed and normal.  I did review prior femur x-rays, 

showing a significant distal femur fracture with hardware extending all the way 

to the hip.  I did suggest performing an x-ray as well as an ultrasound to rule 

out fracture/malposition and the vein thrombosis.  The patient was in 

agreement.  She was administered Ultram 50 mg for pain.  She reports that she 

cannot tolerate other analgesics.  X-rays of the right femur shows a lateral 

buttress plate fracture just above the prior distal femur fracture.  Venous 

ultrasound of the right lower extremity is negative for deep vein thrombosis.





The patient was advised of her x-ray findings.  She was very disappointed and 

was crying.  The patient reports that she does not want to undergo surgery and 

wants to go home.  I did discuss the case further with Dr. Wang, ED attending 

physician, as well as Dr. Rousseau, orthopedic surgeon on call, who stated that 

a knee immobilizer and walker with minimal weight on the leg is satisfactory 

until she can follow-up tomorrow morning with Dr. Kc.  The immobilizer 

was applied, and trial ambulation was successful with a walker.  The patient 

reports that she has enough tramadol at home as needed for pain.  She will 

follow-up with Dr. Kc tomorrow morning.





Medical Decision








Impression





 Primary Impression:  


 Right femur surgical buttress plate fracture


 Additional Impression:  


 History of ORIF of right distal femur fracture





Departure Information


Referrals


DINESH West M.D. (PCP)





Patient Instructions


My [facility]





Problem Qualifiers

## 2017-09-20 ENCOUNTER — HOSPITAL ENCOUNTER (OUTPATIENT)
Dept: HOSPITAL 45 - C.RDSM | Age: 62
Discharge: HOME | End: 2017-09-20
Attending: ORTHOPAEDIC SURGERY
Payer: COMMERCIAL

## 2017-09-20 DIAGNOSIS — M15.9: Primary | ICD-10-CM

## 2018-02-16 ENCOUNTER — HOSPITAL ENCOUNTER (OUTPATIENT)
Dept: HOSPITAL 45 - C.RDSM | Age: 63
Discharge: HOME | End: 2018-02-16
Attending: ORTHOPAEDIC SURGERY
Payer: COMMERCIAL

## 2018-02-16 DIAGNOSIS — Z87.81: Primary | ICD-10-CM

## 2018-08-09 ENCOUNTER — HOSPITAL ENCOUNTER (INPATIENT)
Dept: HOSPITAL 45 - C.EDB | Age: 63
LOS: 2 days | Discharge: HOME HEALTH | DRG: 603 | End: 2018-08-11
Attending: HOSPITALIST | Admitting: HOSPITALIST
Payer: COMMERCIAL

## 2018-08-09 VITALS
OXYGEN SATURATION: 97 % | TEMPERATURE: 98.06 F | HEART RATE: 72 BPM | DIASTOLIC BLOOD PRESSURE: 85 MMHG | SYSTOLIC BLOOD PRESSURE: 155 MMHG

## 2018-08-09 VITALS
BODY MASS INDEX: 50.98 KG/M2 | HEIGHT: 59 IN | BODY MASS INDEX: 50.98 KG/M2 | WEIGHT: 252.87 LBS | WEIGHT: 252.87 LBS | HEIGHT: 59 IN

## 2018-08-09 VITALS — OXYGEN SATURATION: 95 %

## 2018-08-09 DIAGNOSIS — I10: ICD-10-CM

## 2018-08-09 DIAGNOSIS — F32.9: ICD-10-CM

## 2018-08-09 DIAGNOSIS — E66.01: ICD-10-CM

## 2018-08-09 DIAGNOSIS — Z51.81: ICD-10-CM

## 2018-08-09 DIAGNOSIS — Z79.899: ICD-10-CM

## 2018-08-09 DIAGNOSIS — Z88.3: ICD-10-CM

## 2018-08-09 DIAGNOSIS — Z90.81: ICD-10-CM

## 2018-08-09 DIAGNOSIS — L03.115: Primary | ICD-10-CM

## 2018-08-09 DIAGNOSIS — F41.9: ICD-10-CM

## 2018-08-09 DIAGNOSIS — D58.0: ICD-10-CM

## 2018-08-09 DIAGNOSIS — Z87.442: ICD-10-CM

## 2018-08-09 DIAGNOSIS — Z82.49: ICD-10-CM

## 2018-08-09 DIAGNOSIS — Z88.8: ICD-10-CM

## 2018-08-09 DIAGNOSIS — R60.0: ICD-10-CM

## 2018-08-09 DIAGNOSIS — Z82.3: ICD-10-CM

## 2018-08-09 DIAGNOSIS — Z79.82: ICD-10-CM

## 2018-08-09 LAB
BASOPHILS # BLD: 0.1 K/UL (ref 0–0.2)
BASOPHILS NFR BLD: 0.5 %
BUN SERPL-MCNC: 17 MG/DL (ref 7–18)
CALCIUM SERPL-MCNC: 8.6 MG/DL (ref 8.5–10.1)
CO2 SERPL-SCNC: 25 MMOL/L (ref 21–32)
CREAT SERPL-MCNC: 1.15 MG/DL (ref 0.6–1.2)
EOS ABS #: 0.83 K/UL (ref 0–0.5)
EOSINOPHIL NFR BLD AUTO: 413 K/UL (ref 130–400)
GLUCOSE SERPL-MCNC: 86 MG/DL (ref 70–99)
HCT VFR BLD CALC: 46.8 % (ref 37–47)
HGB BLD-MCNC: 14.9 G/DL (ref 12–16)
IG#: 0.07 K/UL (ref 0–0.02)
IMM GRANULOCYTES NFR BLD AUTO: 12.6 %
INR PPP: 1 (ref 0.9–1.1)
LYMPHOCYTES # BLD: 2.54 K/UL (ref 1.2–3.4)
MCH RBC QN AUTO: 27.1 PG (ref 25–34)
MCHC RBC AUTO-ENTMCNC: 31.8 G/DL (ref 32–36)
MCV RBC AUTO: 85.2 FL (ref 80–100)
MONO ABS #: 1.82 K/UL (ref 0.11–0.59)
MONOCYTES NFR BLD: 9 %
NEUT ABS #: 14.8 K/UL (ref 1.4–6.5)
NEUTROPHILS # BLD AUTO: 4.1 %
NEUTROPHILS NFR BLD AUTO: 73.5 %
NRBC BLD AUTO-RTO: 0.3 %
NUCLEATED RED BLOOD CELL ABS: 0.06 K/UL (ref 0–0)
PMV BLD AUTO: 11.8 FL (ref 7.4–10.4)
POTASSIUM SERPL-SCNC: 3.5 MMOL/L (ref 3.5–5.1)
PTT PATIENT: 30.6 SECONDS (ref 21–31)
RED CELL DISTRIBUTION WIDTH CV: 17.8 % (ref 11.5–14.5)
RED CELL DISTRIBUTION WIDTH SD: 55.3 FL (ref 36.4–46.3)
SODIUM SERPL-SCNC: 138 MMOL/L (ref 136–145)
WBC # BLD AUTO: 20.16 K/UL (ref 4.8–10.8)

## 2018-08-09 RX ADMIN — LORAZEPAM PRN MG: 0.5 TABLET ORAL at 19:47

## 2018-08-09 RX ADMIN — ACETAMINOPHEN PRN MG: 325 TABLET ORAL at 19:47

## 2018-08-09 RX ADMIN — ENOXAPARIN SODIUM SCH MG: 40 INJECTION SUBCUTANEOUS at 19:48

## 2018-08-09 RX ADMIN — SERTRALINE HYDROCHLORIDE SCH MG: 50 TABLET, FILM COATED ORAL at 19:49

## 2018-08-09 NOTE — EMERGENCY ROOM VISIT NOTE
History


Report prepared by Cherise:  Dorothy Arnold


Under the Supervision of:  Dr. Job Mendoza M.D.


First contact with patient:  14:52


Chief Complaint:  OTHER COMPLAINT


Stated Complaint:  CELLULITIS





History of Present Illness


The patient is a 63 year old female who presents to the Emergency Room with 

complaints of constant, worsening R leg cellulitis beginning a few weeks ago. 

She notes she was seen at Urgent Care yesterday, and began Keflex as well as 

Lasix. She notes itchiness and swelling of the area, but no pain. The patient 

reports the leg is "oozing." She denies recent trauma to the area, and notes 

she broke her R femur last year. She denies blood thinner use.





   Source of History:  patient


   Onset:  few weeks ago


   Position:  leg (right)


   Quality:  other (cellulitis)


   Timing:  constant, worsening


Note:


Associated symptom: itchiness of R leg, swelling of R leg, "oozing" of R leg. 

Denies: R leg pain.





Review of Systems


See HPI for pertinent positives and negatives.  A total of ten systems were 

reviewed and were otherwise negative.





Past Medical & Surgical


Medical Problems:


(1) Anxiety disorder


(2) cellulitis failing outpt treatment


(3) Depression


(4) Fracture of distal end of right femur


(5) Hereditary spherocytosis


(6) HTN (hypertension)


(7) Hypertension


Surgical Problems:


(1) H/O splenectomy


(2) S/P appendectomy


(3) S/P cholecystectomy








Family History





Diabetes mellitus


FH: CVA (cerebrovascular accident)


FHx: cancer


Hypertension





Social History


Smoking Status:  Never Smoker


Alcohol Use:  none


Drug Use:  none


Marital Status:  single


Occupation Status:  unemployed





Current/Historical Medications


Scheduled


Allopurinol (Zyloprim), 300 MG PO DAILY


Aspirin (Aspirin Ec), 81 MG PO QAM


Calcium Carbonate (Calcium 600), 1 TAB PO DAILY


Cephalexin Monohydrate (Keflex), 500 MG PO Q6


Cholecalciferol (Vitamin D3), 2,000 UNITS PO DAILY


Folic Acid (Folvite), 1 MG PO DAILY


Lisinopril (Prinivil), 40 MG PO QAM


Nebivolol Hcl (Bystolic), 10 MG PO QPM


Potassium Chloride (Micro-K Ext Rel), 10 MEQ PO UD


Sertraline (Zoloft), 50 MG PO QPM





Scheduled PRN


Furosemide (Lasix), 20 MG PO Q12 PRN for toni


Lorazepam (Ativan), 0.5 MG PO BID PRN for Anxiety





Allergies


Coded Allergies:  


     Prednisone (Verified  Allergy, Severe, HIVES, 8/9/18)


     Metronidazole (Verified  Adverse Reaction, Unknown, "jumpiness" per pt, 8/9 /18)





Physical Exam


Vital Signs











  Date Time  Temp Pulse Resp B/P (MAP) Pulse Ox O2 Delivery O2 Flow Rate FiO2


 


8/9/18 16:45  78 18 180/107 95 Room Air  


 


8/9/18 14:45 36.6 77 20 175/72 96 Room Air  











Physical Exam


Physical Exam 


GENERAL:  She is oriented to person, place, and time. She appears well-

developed and well-nourished. She does not appear distressed.


HENT:  Exam performed. 


   Head:  Normocephalic and atraumatic. 


   Right Ear:  External ear normal. No mastoid tenderness. 


   Left Ear: External ear normal. No mastoid tenderness. 


   Mouth/Throat:  The oropharynx is clear and moist. No trismus in the jaw. No 

dental abscesses or uvula swelling. No oropharyngeal exudate or tonsillar 

abscesses.


EYES: Conjunctivae and EOM are normal. Pupils are equal, round, and reactive to 

light. Right eye exhibits no discharge. Left eye exhibits no discharge. No 

scleral icterus.


NECK: Normal range of motion. Neck supple. No JVD present. No spinous process 

tenderness present. No carotid bruit present. No rigidity. No tracheal 

deviation and normal range of motion present. No Brudzinski's sign and no Kernig

's sign noted.


CV: Normal rate, regular rhythm, normal heart sounds and intact distal pulses. 

There is no peripheral edema. Palpable radial pulses bue.


PULM/CHEST:  Effort normal and breath sounds normal. No respiratory distress. 

No stridor. She has no wheezes. She has no rales. 


   Chest Wall:  She exhibits no tenderness.


ABD: The abdomen is soft. Bowel sounds are normal. She has no distension. No 

mass is present. There is no tenderness. There is no rebound, no guarding, no 

Santana's sign and no tenderness at McBurney's point. Rovsig negative


MUSC/SKEL: Normal range of motion. RLE has lymphedema and surrounding erythema 

with drainage coming diffusely from leg.


NEURO: She is alert and oriented to person, place, and time. She has normal 

strength. No cranial nerve deficit or sensory deficit. Coordination and gait 

normal. GCS eye subscore is 4. GCS verbal subscore is 5. GCS motor subscore is 

6. Cerebellar tests wnl.


SKIN: Skin is warm and dry. She is not diaphoretic.


PSYCH: She has a normal mood and affect. Behavior is normal. Judgment and 

thought content normal.





Medical Decision & Procedures


ER Provider


Diagnostic Interpretation:


Radiology results as stated below per my review and radiologist interpretation: 





ULTRASOUND RIGHT LOWER EXTREMITY VENOUS 





CLINICAL HISTORY: Right leg pain.





COMPARISON STUDY: Right lower extremity venous ultrasound dated 6/22/2017.





TECHNIQUE: Real-time, grayscale, and color Doppler sonography of the deep veins


of the right lower extremity was performed from the inguinal crease to the calf.


Compression and augmentation were utilized. 





FINDINGS: There is no sonographic evidence of deep venous thrombosis identified


in the right lower extremity. The common femoral, superficial femoral, and


popliteal veins are patent and normally compressible. The greater saphenous vein


and the profunda femoris vein at the junction with the common femoral vein are


clear. The visualized calf veins are patent.





IMPRESSION: There is no sonographic evidence of deep venous thrombosis


identified in the right lower extremity.





Electronically signed by:  Francois Pike M.D.


8/9/2018 4:37 PM





Dictated Date/Time:  8/9/2018 4:37 PM





Laboratory Results


8/9/18 15:40








Red Blood Count 5.49, Mean Corpuscular Volume 85.2, Mean Corpuscular Hemoglobin 

27.1, Mean Corpuscular Hemoglobin Concent 31.8, Mean Platelet Volume 11.8, 

Neutrophils (%) (Auto) 73.5, Lymphocytes (%) (Auto) 12.6, Monocytes (%) (Auto) 

9.0, Eosinophils (%) (Auto) 4.1, Basophils (%) (Auto) 0.5, Neutrophils # (Auto) 

14.80, Lymphocytes # (Auto) 2.54, Monocytes # (Auto) 1.82, Eosinophils # (Auto) 

0.83, Basophils # (Auto) 0.10





8/9/18 15:40

















Test


  8/9/18


15:40


 


White Blood Count


  20.16 K/uL


(4.8-10.8)


 


Red Blood Count


  5.49 M/uL


(4.2-5.4)


 


Hemoglobin


  14.9 g/dL


(12.0-16.0)


 


Hematocrit 46.8 % (37-47) 


 


Mean Corpuscular Volume


  85.2 fL


()


 


Mean Corpuscular Hemoglobin


  27.1 pg


(25-34)


 


Mean Corpuscular Hemoglobin


Concent 31.8 g/dl


(32-36)


 


Platelet Count


  413 K/uL


(130-400)


 


Mean Platelet Volume


  11.8 fL


(7.4-10.4)


 


Neutrophils (%) (Auto) 73.5 % 


 


Lymphocytes (%) (Auto) 12.6 % 


 


Monocytes (%) (Auto) 9.0 % 


 


Eosinophils (%) (Auto) 4.1 % 


 


Basophils (%) (Auto) 0.5 % 


 


Neutrophils # (Auto)


  14.80 K/uL


(1.4-6.5)


 


Lymphocytes # (Auto)


  2.54 K/uL


(1.2-3.4)


 


Monocytes # (Auto)


  1.82 K/uL


(0.11-0.59)


 


Eosinophils # (Auto)


  0.83 K/uL


(0-0.5)


 


Basophils # (Auto)


  0.10 K/uL


(0-0.2)


 


RDW Standard Deviation


  55.3 fL


(36.4-46.3)


 


RDW Coefficient of Variation


  17.8 %


(11.5-14.5)


 


Immature Granulocyte % (Auto) 0.3 % 


 


Immature Granulocyte # (Auto)


  0.07 K/uL


(0.00-0.02)


 


Nucleated RBC Absolute Count


(auto) 0.06 K/uL


(0-0)


 


Nucleated Red Blood Cells % 0.3 % 


 


Anion Gap


  7.0 mmol/L


(3-11)


 


Est Creatinine Clear Calc


Drug Dose 58.3 ml/min 


 


 


Estimated GFR (


American) 59.5 


 


 


Estimated GFR (Non-


American 51.3 


 


 


BUN/Creatinine Ratio 14.4 (10-20) 


 


Lactic Acid Level


  1.0 mmol/L


(0.4-2.0)


 


Calcium Level


  8.6 mg/dl


(8.5-10.1)





Laboratory results reviewed by me





Medications Administered











 Medications


  (Trade)  Dose


 Ordered  Sig/Bob


 Route  Start Time


 Stop Time Status Last Admin


Dose Admin


 


 Vancomycin HCl


  (Vancomycin 1gm


 Ed/Asu Omnicell)  1 gm  NOW  STAT


 IV  8/9/18 16:26


 8/9/18 16:28 DC 8/9/18 16:42


1 GM











ED Course


1452: The patient was evaluated in room B11B. A complete history and physical 

exam was performed.





1626: Ordered Vancomycin HCl 1 gm IV.





1644: Vital signs stable. US negative for DVT, labs show leukocytosis of 20, 

otherwise within normal limits. Patient will be treated with vancomycin for 

cellulitis and admitted to hospital service. Discussed the patient's case with 

Dr. Kerns [facility] hospitalist. The patient will be evaluated for further 

treatment and disposition.





Medical Decision


 Vital signs stable. US negative for DVT, labs show leukocytosis of 20, 

otherwise within normal limits. Patient will be treated with vancomycin for 

cellulitis and admitted to hospital service. Discussed the patient's case with 

Dr. Kerns [facility] hospitalist. The patient will be evaluated for further 

treatment and disposition.





Medication Reconcilliation


Current Medication List:  was personally reviewed by me





Blood Pressure Screening


Patient's blood pressure:  Elevated blood pressure


Blood pressure disposition:  Referred to PCP (referred to hospitalist)





Consults


Time Called:  1638


Consulting Physician:  Dr. Kerns [facility] hospitalist


Returned Call:  1642


Discussed the patient's case with Dr. Kerns [facility] hospitalist. The patient will 

be evaluated for further treatment and disposition.





Impression





 Primary Impression:  


 Cellulitis





Scribe Attestation


The scribe's documentation has been prepared under my direction and personally 

reviewed by me in its entirety. I confirm that the note above accurately 

reflects all work, treatment, procedures, and medical decision making performed 

by me.





The chart was completed utilizing Dragon Speech voice recognition software. 

Grammatical errors, random word insertions, pronoun errors, and incomplete 

sentences are an occasional consequence of this system due to software 

limitations, ambient noise, and hardware issues. Any formal questions or 

concerns about the content, text, or information contained within the body of 

this dictation should be directly addressed to the physician for clarification.





Departure Information


Dispostion


Being Evaluated By Hospitalist





Referrals


DINESH West M.D. (PCP)





Patient Instructions


My [facility]





Problem Qualifiers








 Primary Impression:  


 Cellulitis


 Site of cellulitis:  extremity  Site of cellulitis of extremity:  lower 

extremity  Laterality:  right  Qualified Codes:  L03.115 - Cellulitis of right 

lower limb

## 2018-08-09 NOTE — DIAGNOSTIC IMAGING REPORT
ULTRASOUND RIGHT LOWER EXTREMITY VENOUS 



CLINICAL HISTORY: Right leg pain.



COMPARISON STUDY: Right lower extremity venous ultrasound dated 6/22/2017.



TECHNIQUE: Real-time, grayscale, and color Doppler sonography of the deep veins

of the right lower extremity was performed from the inguinal crease to the calf.

Compression and augmentation were utilized. 



FINDINGS: There is no sonographic evidence of deep venous thrombosis identified

in the right lower extremity. The common femoral, superficial femoral, and

popliteal veins are patent and normally compressible. The greater saphenous vein

and the profunda femoris vein at the junction with the common femoral vein are

clear. The visualized calf veins are patent.



IMPRESSION: There is no sonographic evidence of deep venous thrombosis

identified in the right lower extremity.







Electronically signed by:  Francois Pike M.D.

8/9/2018 4:37 PM



Dictated Date/Time:  8/9/2018 4:37 PM

## 2018-08-09 NOTE — HISTORY AND PHYSICAL
History & Physical


Date & Time of Service:


Aug 9, 2018 at 17:17


Chief Complaint:


Cellulitis


Primary Care Physician:


DINESH West M.D.


History of Present Illness


66-year-old asplenic female had a splenectomy due to hereditary spherocytosis 

who presents with a three-week history of progressive redness to right lower 

extremities patient denies any significant injury she has had persistent 

problems since she has had multiple surgeries for distal fragment femur 

fracture on that side.  The patient states she has small abrasion which was 

pruritic which she itched with her cane and then became markedly red and 

swollen.  The patient [facility] a [facility] who prescribed Keflex and 

Lasix.  Patient in no improvement in fact she has had worsening she has had 

weeping of fluid and presented to emergency department with a significant 

progressive cellulitis with failed outpatient therapy with Keflex





The patient denies any other healthcare problems no fevers or chills chest pain 

pressure shortness of breath changes in bowel or bladder habits and does have 

chronic mild swelling to her lower legs she is morbidly obese with a BMI of 51 

and this likely impacts her risk for lower extremity infections being difficult 

to treat





Past Medical/Surgical History


Medical Problems:


(1) Anxiety disorder


(2) cellulitis failing outpt treatment


(3) Depression


(4) Fall


(5) Femur fracture, right


(6) Fracture of distal end of right femur


(7) Hereditary spherocytosis


(8) HTN (hypertension)


(9) Hypertension


Renal colic with uric acid stones


Surgical Problems:


(1) H/O splenectomy


(2) S/P appendectomy


(3) S/P cholecystectomy


Hysterectomy





Family History





Diabetes mellitus


FH: CVA (cerebrovascular accident)


FHx: cancer


Hypertension





Social History


Smoking Status:  Never Smoker


Drug Use:  none


Marital Status:  single


Occupational Status:  unemployed





Immunizations


History of Influenza Vaccine:  No


History of Tetanus Vaccine?:  No


History of Pneumococcal:  No


History of Hepatitis B Vaccine:  No





Allergies


Coded Allergies:  


     Prednisone (Verified  Allergy, Severe, HIVES, 8/9/18)


     Metronidazole (Verified  Adverse Reaction, Unknown, "jumpiness" per pt, 8/9 /18)





Home Medications


Scheduled


Allopurinol (Zyloprim), 300 MG PO DAILY


Aspirin (Aspirin Ec), 81 MG PO QAM


Calcium Carbonate (Calcium 600), 1 TAB PO DAILY


Cephalexin Monohydrate (Keflex), 500 MG PO Q6


Cholecalciferol (Vitamin D3), 2,000 UNITS PO DAILY


Folic Acid (Folvite), 1 MG PO DAILY


Lisinopril (Prinivil), 40 MG PO QAM


Nebivolol Hcl (Bystolic), 10 MG PO QPM


Potassium Chloride (Micro-K Ext Rel), 10 MEQ PO UD


Sertraline (Zoloft), 50 MG PO QPM





Scheduled PRN


Furosemide (Lasix), 20 MG PO Q12 PRN for toni


Lorazepam (Ativan), 0.5 MG PO BID PRN for Anxiety





Review of Systems


ROS:


well nourished well developed.





No double vision blurry vision


No problems with speech or swallowing


No palpitations, chest pain or pressure


No Wheezing or breathing issues


No abdominal pain nausea vomiting diarrhea changes in appetite or weight


No burning urine urine frequency or changes in color


No focal joint pain or muscle pain but a tightness feeling in her leg


Marked redness to her right lower extremity distal to her knee does not involve 

the foot there is abrasion bullae blisters and weeping of fluid


No other unusual bruising or bleeding


No focused back pain or numbness or loss of strength


No changes in memory or confusion





Physical Exam


Vital Signs











  Date Time  Temp Pulse Resp B/P (MAP) Pulse Ox O2 Delivery O2 Flow Rate FiO2


 


8/9/18 16:45  78 18 180/107 95 Room Air  


 


8/9/18 14:45 36.6 77 20 175/72 96 Room Air  








General Appearance:  WD/WN, + mild distress, + obese


Head:  normocephalic, atraumatic


Eyes:  normal inspection, sclerae normal


Neck:  supple, no JVD


Respiratory/Chest:  chest non-tender, lungs clear, normal breath sounds


Cardiovascular:  regular rate, rhythm, no murmur


Abdomen/GI:  normal bowel sounds, non tender, soft


Back:  normal inspection, no CVA tenderness


Extremities/Musculoskelatal:  + pedal edema, + swelling (Swelling right leg 

greater than left erythema which is outlined in a skin marker approximately the 

distal two thirds of her lower leg in a circumferential fashion with bullae 

blisters and weeping of fluid)


Neurologic/Psych:  alert, oriented x 3


Skin:  + rash, + pertinent finding (Dermatitis as suggested)





Diagnostics


Laboratory Results





Results Past 24 Hours








Test


  8/9/18


15:40 Range/Units


 


 


White Blood Count 20.16 4.8-10.8  K/uL


 


Red Blood Count 5.49 4.2-5.4  M/uL


 


Hemoglobin 14.9 12.0-16.0  g/dL


 


Hematocrit 46.8 37-47  %


 


Mean Corpuscular Volume 85.2   fL


 


Mean Corpuscular Hemoglobin 27.1 25-34  pg


 


Mean Corpuscular Hemoglobin


Concent 31.8


  32-36  g/dl


 


 


Platelet Count 413 130-400  K/uL


 


Mean Platelet Volume 11.8 7.4-10.4  fL


 


Neutrophils (%) (Auto) 73.5  %


 


Lymphocytes (%) (Auto) 12.6  %


 


Monocytes (%) (Auto) 9.0  %


 


Eosinophils (%) (Auto) 4.1  %


 


Basophils (%) (Auto) 0.5  %


 


Neutrophils # (Auto) 14.80 1.4-6.5  K/uL


 


Lymphocytes # (Auto) 2.54 1.2-3.4  K/uL


 


Monocytes # (Auto) 1.82 0.11-0.59  K/uL


 


Eosinophils # (Auto) 0.83 0-0.5  K/uL


 


Basophils # (Auto) 0.10 0-0.2  K/uL


 


RDW Standard Deviation 55.3 36.4-46.3  fL


 


RDW Coefficient of Variation 17.8 11.5-14.5  %


 


Immature Granulocyte % (Auto) 0.3  %


 


Immature Granulocyte # (Auto) 0.07 0.00-0.02  K/uL


 


Nucleated RBC Absolute Count


(auto) 0.06


  0-0  K/uL


 


 


Nucleated Red Blood Cells % 0.3  %


 


Sodium Level 138 136-145  mmol/L


 


Potassium Level 3.5 3.5-5.1  mmol/L


 


Chloride Level 106   mmol/L


 


Carbon Dioxide Level 25 21-32  mmol/L


 


Anion Gap 7.0 3-11  mmol/L


 


Blood Urea Nitrogen 17 7-18  mg/dl


 


Creatinine


  1.15


  0.60-1.20


mg/dl


 


Est Creatinine Clear Calc


Drug Dose 58.3


   ml/min


 


 


Estimated GFR (


American) 59.5


   


 


 


Estimated GFR (Non-


American 51.3


   


 


 


BUN/Creatinine Ratio 14.4 10-20  


 


Random Glucose 86 70-99  mg/dl


 


Lactic Acid Level 1.0 0.4-2.0  mmol/L


 


Calcium Level 8.6 8.5-10.1  mg/dl











Diagnostic Radiology


Right lower extremity venous Doppler negative for DVT





Impression


Assessment and Plan


63-year-old female with outer spleen who presents with the cellulitis failing 

outpatient therapy





For cellulitis she will have a dose of Lasix of a low sodium diet will be 

placed on vancomycin blood cultures were obtained and wound care consult will 

be undertaken





For her hypertension she is markedly hypertensive in the ER will maintain 

lisinopril and by systolic or a pharmacy substituted with as needed clonidine 

p.o. for hypertensive control





For history of uric acid kidney stones allopurinol 300 mg will be maintained





For anxiety depression will use Zoloft 50





For DVT prevention will use Lovenox





Resuscitation Status








VTE Prophylaxis


Will order VTE Prophylaxis:  Yes

## 2018-08-09 NOTE — PHARMACY PROGRESS NOTE
Pharmacy Antibiotic Consult


Date of Service:


Aug 9, 2018.


Pharmacy Dosing Scope


Pharmacy is consulted to initiate VANCOMYCIN IV dosing therapy, order 

appropriate labs and adjust drug dose/frequency.





Subjective


The patient is a 63 year old female admitted on Aug 9, 2018 at 17:03.





Objective


Height (Feet):  4


Height (Inches):  11.00


Weight (Kilograms):  115.000


Lab Results (24hrs):











Test


  8/9/18


15:40


 


White Blood Count


  20.16 K/uL


(4.8-10.8)


 


Red Blood Count


  5.49 M/uL


(4.2-5.4)


 


Hemoglobin


  14.9 g/dL


(12.0-16.0)


 


Hematocrit 46.8 % (37-47) 


 


Mean Corpuscular Volume


  85.2 fL


()


 


Mean Corpuscular Hemoglobin


  27.1 pg


(25-34)


 


Mean Corpuscular Hemoglobin


Concent 31.8 g/dl


(32-36)


 


Platelet Count


  413 K/uL


(130-400)


 


Mean Platelet Volume


  11.8 fL


(7.4-10.4)


 


Neutrophils (%) (Auto) 73.5 % 


 


Lymphocytes (%) (Auto) 12.6 % 


 


Monocytes (%) (Auto) 9.0 % 


 


Eosinophils (%) (Auto) 4.1 % 


 


Basophils (%) (Auto) 0.5 % 


 


Neutrophils # (Auto)


  14.80 K/uL


(1.4-6.5)


 


Lymphocytes # (Auto)


  2.54 K/uL


(1.2-3.4)


 


Monocytes # (Auto)


  1.82 K/uL


(0.11-0.59)


 


Eosinophils # (Auto)


  0.83 K/uL


(0-0.5)


 


Basophils # (Auto)


  0.10 K/uL


(0-0.2)


 


RDW Standard Deviation


  55.3 fL


(36.4-46.3)


 


RDW Coefficient of Variation


  17.8 %


(11.5-14.5)


 


Immature Granulocyte % (Auto) 0.3 % 


 


Immature Granulocyte # (Auto)


  0.07 K/uL


(0.00-0.02)


 


Nucleated RBC Absolute Count


(auto) 0.06 K/uL


(0-0)


 


Nucleated Red Blood Cells % 0.3 % 


 


Prothrombin Time


  10.9 SECONDS


(9.0-12.0)


 


Prothromb Time International


Ratio 1.0 (0.9-1.1) 


 


 


Activated Partial


Thromboplast Time 30.6 SECONDS


(21.0-31.0)


 


Partial Thromboplastin Ratio 1.2 


 


Sodium Level


  138 mmol/L


(136-145)


 


Potassium Level


  3.5 mmol/L


(3.5-5.1)


 


Chloride Level


  106 mmol/L


()


 


Carbon Dioxide Level


  25 mmol/L


(21-32)


 


Anion Gap


  7.0 mmol/L


(3-11)


 


Blood Urea Nitrogen


  17 mg/dl


(7-18)


 


Creatinine


  1.15 mg/dl


(0.60-1.20)


 


Est Creatinine Clear Calc


Drug Dose 58.3 ml/min 


 


 


Estimated GFR (


American) 59.5 


 


 


Estimated GFR (Non-


American 51.3 


 


 


BUN/Creatinine Ratio 14.4 (10-20) 


 


Random Glucose


  86 mg/dl


(70-99)


 


Lactic Acid Level


  1.0 mmol/L


(0.4-2.0)


 


Calcium Level


  8.6 mg/dl


(8.5-10.1)











Assessment & Plan


64yo female ordered VANCOMYCIN for cellulitis.  Renal function is good.





VANCOMYCIN:


* Loading dose:   VANCOMYCIN 1000mg + 1750mg (~24mg/kg) IV then


                  VANCOMYCIN 1500mg (~13mg/kg) IV every 18 hours.


* Estimated Pk parameters:   Vd ~0.54 L/kg    ke ~0.053   t1/2 ~13 hours


* Goal trough level estimate:  between 15 - 20 mcg/mL.


* Trough level has been ordered for:   08 / 11 / 18 @ 0800.


* 


Pharmacy will continue to follow and will adjust dose/frequency as necessary.  

Thank you

## 2018-08-10 VITALS
DIASTOLIC BLOOD PRESSURE: 79 MMHG | OXYGEN SATURATION: 98 % | SYSTOLIC BLOOD PRESSURE: 149 MMHG | TEMPERATURE: 98.42 F | HEART RATE: 80 BPM

## 2018-08-10 VITALS — OXYGEN SATURATION: 95 %

## 2018-08-10 VITALS
OXYGEN SATURATION: 96 % | TEMPERATURE: 98.78 F | DIASTOLIC BLOOD PRESSURE: 78 MMHG | HEART RATE: 56 BPM | SYSTOLIC BLOOD PRESSURE: 179 MMHG

## 2018-08-10 VITALS
TEMPERATURE: 98.42 F | HEART RATE: 71 BPM | DIASTOLIC BLOOD PRESSURE: 70 MMHG | SYSTOLIC BLOOD PRESSURE: 148 MMHG | OXYGEN SATURATION: 95 %

## 2018-08-10 LAB
BUN SERPL-MCNC: 15 MG/DL (ref 7–18)
CALCIUM SERPL-MCNC: 8.8 MG/DL (ref 8.5–10.1)
CO2 SERPL-SCNC: 28 MMOL/L (ref 21–32)
CREAT SERPL-MCNC: 1.08 MG/DL (ref 0.6–1.2)
EOSINOPHIL NFR BLD AUTO: 420 K/UL (ref 130–400)
GLUCOSE SERPL-MCNC: 100 MG/DL (ref 70–99)
HCT VFR BLD CALC: 47.2 % (ref 37–47)
HGB BLD-MCNC: 14.7 G/DL (ref 12–16)
MCH RBC QN AUTO: 26.9 PG (ref 25–34)
MCHC RBC AUTO-ENTMCNC: 31.1 G/DL (ref 32–36)
MCV RBC AUTO: 86.3 FL (ref 80–100)
NRBC BLD AUTO-RTO: 0.3 %
NUCLEATED RED BLOOD CELL ABS: 0.06 K/UL (ref 0–0)
PMV BLD AUTO: 12 FL (ref 7.4–10.4)
POTASSIUM SERPL-SCNC: 3.7 MMOL/L (ref 3.5–5.1)
RED CELL DISTRIBUTION WIDTH CV: 18.2 % (ref 11.5–14.5)
RED CELL DISTRIBUTION WIDTH SD: 57.3 FL (ref 36.4–46.3)
SODIUM SERPL-SCNC: 138 MMOL/L (ref 136–145)
WBC # BLD AUTO: 19.67 K/UL (ref 4.8–10.8)

## 2018-08-10 RX ADMIN — Medication SCH MG: at 09:14

## 2018-08-10 RX ADMIN — ACETAMINOPHEN PRN MG: 325 TABLET ORAL at 16:13

## 2018-08-10 RX ADMIN — LORAZEPAM PRN MG: 0.5 TABLET ORAL at 23:41

## 2018-08-10 RX ADMIN — ALLOPURINOL SCH MG: 300 TABLET ORAL at 09:14

## 2018-08-10 RX ADMIN — VANCOMYCIN HYDROCHLORIDE SCH MLS/HR: 1 INJECTION, POWDER, LYOPHILIZED, FOR SOLUTION INTRAVENOUS at 13:18

## 2018-08-10 RX ADMIN — ACETAMINOPHEN PRN MG: 325 TABLET ORAL at 23:41

## 2018-08-10 RX ADMIN — ENOXAPARIN SODIUM SCH MG: 40 INJECTION SUBCUTANEOUS at 21:22

## 2018-08-10 RX ADMIN — LISINOPRIL SCH MG: 40 TABLET ORAL at 09:14

## 2018-08-10 RX ADMIN — SERTRALINE HYDROCHLORIDE SCH MG: 50 TABLET, FILM COATED ORAL at 21:22

## 2018-08-10 NOTE — PROGRESS NOTE
Subjective


Date of Service:


Aug 10, 2018.


Subjective


this pt has had great improvement in her erythema, she still has some seeping 

of her leg wounds





Problem List


Medical Problems:


(1) Cellulitis


Status: Acute  





(2) Fall


Status: Acute  





(3) Femur fracture, right


Status: Acute  











Review of Systems


Constitutional:  No fever, No chills, No weakness, No fatigue


Cardiac:  No chest pain, No edema


Abdomen:  No pain, No nausea, No vomiting


Musculoskeletal:  + muscle pain, + swelling, No joint pain, No calf pain


Female :  No dysuria, No urinary frequency


Psychiatric:  No depression symptoms, No anhedonism


Skin:  + rash, + new/changing skin lesions, + color change





Objective


Vital Signs











  Date Time  Temp Pulse Resp B/P (MAP) Pulse Ox O2 Delivery O2 Flow Rate FiO2


 


8/10/18 07:50      Room Air  


 


8/10/18 06:49 36.9 71 18 148/70 (96) 95 Room Air  


 


8/10/18 00:05      Room Air  


 


8/9/18 22:38 36.7 72 18 155/85 (108) 97 Room Air  


 


8/9/18 18:10 36.6 72 18 179/82 95   


 


8/9/18 18:03  72 18 179/82 95 Room Air  


 


8/9/18 17:17     95 Room Air  


 


8/9/18 16:45  78 18 180/107 95 Room Air  











Physical Exam


General Appearance:  WD/WN, + mild distress


Eyes:  normal inspection, sclerae normal


Neck:  supple, no JVD


Respiratory/Chest:  chest non-tender, lungs clear, normal breath sounds


Cardiovascular:  regular rate, rhythm, no murmur


Abdomen:  normal bowel sounds, non tender, soft


Extremities:  + pedal edema, + swelling


Neurologic/Psychiatric:  alert, oriented x 3


Skin:  + rash, + cyanosis





Laboratory Results





Last 24 Hours








Test


  8/10/18


07:07 8/10/18


08:43


 


White Blood Count 19.67 K/uL  


 


Red Blood Count 5.47 M/uL  


 


Hemoglobin 14.7 g/dL  


 


Hematocrit 47.2 %  


 


Mean Corpuscular Volume 86.3 fL  


 


Mean Corpuscular Hemoglobin 26.9 pg  


 


Mean Corpuscular Hemoglobin


Concent 31.1 g/dl 


  


 


 


RDW Standard Deviation 57.3 fL  


 


RDW Coefficient of Variation 18.2 %  


 


Platelet Count 420 K/uL  


 


Mean Platelet Volume 12.0 fL  


 


Nucleated RBC Absolute Count


(auto) 0.06 K/uL 


  


 


 


Nucleated Red Blood Cells % 0.3 %  


 


Sodium Level 138 mmol/L  


 


Potassium Level  mmol/L  3.7 mmol/L 


 


Chloride Level 103 mmol/L  


 


Carbon Dioxide Level 28 mmol/L  


 


Anion Gap 7.0 mmol/L  


 


Blood Urea Nitrogen 15 mg/dl  


 


Creatinine 1.08 mg/dl  


 


Est Creatinine Clear Calc


Drug Dose 60.4 ml/min 


  


 


 


Estimated GFR (


American) 64.2 


  


 


 


Estimated GFR (Non-


American 55.4 


  


 


 


BUN/Creatinine Ratio 14.0  


 


Random Glucose 100 mg/dl  


 


Calcium Level 8.8 mg/dl  


 


Magnesium Level  mg/dl  2.1 mg/dl 











Assessment and Plan


63-year-old female with out a spleen who presents with the cellulitis failing 

outpatient therapy





responded well to one  dose of Lasix, continue  low sodium diet, continues on  

vancomycin pending  blood cultures





 wound care consult will apply [facility]





hypertension she was markedly hypertensive in the ER, continue lisinopril and 

bystolic or a pharmacy substituted,  prn clonidine p.o. for hypertensive control





asymptomatic uric acid kidney stones allopurinol 300 mg will be maintained





anxiety depression  Zoloft 50





For DVT prevention will use Lovenox

## 2018-08-11 VITALS
DIASTOLIC BLOOD PRESSURE: 84 MMHG | HEART RATE: 76 BPM | TEMPERATURE: 98.06 F | OXYGEN SATURATION: 94 % | SYSTOLIC BLOOD PRESSURE: 178 MMHG

## 2018-08-11 VITALS
DIASTOLIC BLOOD PRESSURE: 84 MMHG | HEART RATE: 76 BPM | OXYGEN SATURATION: 94 % | SYSTOLIC BLOOD PRESSURE: 178 MMHG | TEMPERATURE: 98.06 F

## 2018-08-11 VITALS — OXYGEN SATURATION: 95 %

## 2018-08-11 RX ADMIN — ALLOPURINOL SCH MG: 300 TABLET ORAL at 08:06

## 2018-08-11 RX ADMIN — VANCOMYCIN HYDROCHLORIDE SCH MLS/HR: 1 INJECTION, POWDER, LYOPHILIZED, FOR SOLUTION INTRAVENOUS at 08:09

## 2018-08-11 RX ADMIN — Medication SCH MG: at 08:06

## 2018-08-11 RX ADMIN — LISINOPRIL SCH MG: 40 TABLET ORAL at 08:06

## 2018-08-11 NOTE — DISCHARGE SUMMARY
Discharge Summary


Date of Service


Aug 11, 2018.





Discharge Summary


Admission Date:


Aug 9, 2018 at 17:03


Discharge Date:  Aug 11, 2018


Discharge Disposition:  Home with services


Principal Diagnosis:  LE cellulitis, chronic le edema


Immunizations:  


   Have You Had Influenza Vaccine:  No


   History of Tetanus Vaccine?:  No


   History of Pneumococcal:  No


   History of Hepatitis B Vaccine:  No





Medication Reconciliation


New Medications:  


Sulfa/Trimethoprim (Bactrim Ds 800MG/160MG)  Tab


1 TAB PO BID, #16 TAB





 


Changed Medications:  


Furosemide (Lasix) 20 Mg Tab


20 MG PO DAILY, #30 TAB (Changed from: Q12; Removed Reason)





 


Continued Medications:  


Allopurinol (Zyloprim) 300 Mg Tab


300 MG PO DAILY, TAB





Aspirin (Aspirin Ec) 81 Mg Tab


81 MG PO QAM





Calcium Carbonate (Calcium 600) 600 Mg Tab


1 TAB PO DAILY





Cholecalciferol (Vitamin D3) 2,000 Unit Tab


2000 UNITS PO DAILY for 90 Days, TAB 3 Refills





Folic Acid (Folvite) 1 Mg Tab


1 MG PO DAILY, TAB





Lisinopril (Prinivil) 40 Mg Tab


40 MG PO QAM, TAB





Lorazepam (Ativan) 0.5 Mg Tab


0.5 MG PO BID PRN for Anxiety, TAB





Nebivolol Hcl (Bystolic) 10 Mg Tab


10 MG PO QPM, TAB





Potassium Chloride (Micro-K Ext Rel) 10 Meq Capcr


10 MEQ PO UD, CAP


take 10meq with every dose of furosemide


Sertraline (Zoloft) 50 Mg Tab


50 MG PO QPM, TAB





 


Discontinued Medications:  


Cephalexin Monohydrate (Keflex) 500 Mg Cap


500 MG PO Q6, CAP











Discharge Exam


Review of Systems:  


   Constitutional:  No fever, No chills, No weakness


   Cardiovascular:  No chest pain, No edema


   Abdomen:  No pain, No nausea, No vomiting


   Musculoskeletal:  + swelling, No joint pain, No muscle pain


Physical Exam:  


   General Appearance:  WD/WN, + obese


   Neck:  supple, no JVD


   Respiratory/Chest:  chest non-tender, lungs clear, normal breath sounds


   Abdomen / GI:  normal bowel sounds, non tender, soft





Hospital Course


63-year-old female with out a spleen who presents with the cellulitis failing 

outpatient therapy





responded well to one  dose of Lasix, continue  low sodium diet, will send home 

on bactrim, continue low dose po lasix and reinforce leg elevation and 

compression stockings





 wound care pt requests to follow up in [facility]





hypertension she was markedly hypertensive in the ER, continue lisinopril and 

bystolic or a pharmacy substituted,  prn clonidine p.o. for hypertensive control





asymptomatic uric acid kidney stones allopurinol 300 mg will be maintained





Morbid obesity, we discussed how weight loss would help her lower leg swelling 

she as a BMI>50





anxiety depression  Zoloft 50


Total Time Spent:  Greater than 30 minutes


This includes examination of the patient, discharge planning, medication 

reconciliation, and communication with other providers.





Discharge Instructions


Please refer to the electronic Patient Visit Report (Discharge Instructions) 

for additional information.

## 2018-08-11 NOTE — PHARMACY PROGRESS NOTE
Pharmacy Abx Dose Short Note


Date of Service


Aug 11, 2018.





Assessment & Plan


Assessment








63 year old female receiving Vancomycin for treatment of cellulitis


Day # 3 of antimicrobial therapy.











Plan














Item Value  Date Time


 


Vancomycin Level Trough 12.0 mcg/ml 8/11/18 0758














Vancomycin


* Trough level of 12.0 mcg/mL is therapeutic


* Continue dose of 1500 mg IV every 18 hours  


* Goal trough level for cellulitis : 10 to 15 mcg/mL








Patient is being discharged today and has new RX for Bactrim per med. rec. If 

not discharged, will order additional trough to access appropriateness.











Pharmacy will continue to follow and will adjust dose/frequency as necessary.  

Thank you.

## 2018-08-11 NOTE — DISCHARGE INSTRUCTIONS
Discharge Instructions


Date of Service


Aug 11, 2018.





Admission


Reason for Admission:  Cellulitis Failing Outpt Treatment





Discharge


Discharge Diagnosis / Problem:  cellulitis right lower extremity





Discharge Goals


Goal(s):  Diagnostic testing, Therapeutic intervention





Activity Recommendations


Activity Limitations:  as noted below


Lifting Limitations:  gradually increase as tolerated





Change dressing once a day and when wet





Follow up with wound care center and primary care doctor within one week of 

discharge





Elevate legs as much as possible





low sodium diet please


.





Current Hospital Diet


Patient's current hospital diet: Low Sodium Diet (2gm Na)





Discharge Diet


Recommended Diet:  Low Sodium Diet (2gm Na)





Pending Studies


Studies pending at discharge:  no





Medical Emergencies








.


Who to Call and When:





Medical Emergencies:  If at any time you feel your situation is an emergency, 

please call 911 immediately.





.





Non-Emergent Contact


Non-Emergency issues call your:  Primary Care Provider


Call Non-Emergent contact if:  temperature is above 101, your pain is not 

controlled





.


.








"Provider Documentation" section prepared by Robert Priest.








.